# Patient Record
Sex: MALE | Race: WHITE | Employment: OTHER
[De-identification: names, ages, dates, MRNs, and addresses within clinical notes are randomized per-mention and may not be internally consistent; named-entity substitution may affect disease eponyms.]

---

## 2021-07-19 ENCOUNTER — TRANSCRIBE ORDERS (OUTPATIENT)
Dept: OTHER | Age: 41
End: 2021-07-19

## 2021-07-19 DIAGNOSIS — M79.2 NEUROPATHIC PAIN: ICD-10-CM

## 2021-07-19 DIAGNOSIS — J01.01 ACUTE RECURRENT MAXILLARY SINUSITIS: Primary | ICD-10-CM

## 2021-10-06 NOTE — TELEPHONE ENCOUNTER
FUTURE VISIT INFORMATION      FUTURE VISIT INFORMATION:    Date: 10.26.21    Time: 8 AM    Location:  Allergy  REFERRAL INFORMATION:    Referring provider:  Liliana Rogers    Referring providers clinic:  [facility]    Reason for visit/diagnosis  Acute recurrent maxillary sinusitis, immune deficiency disorder.     RECORDS REQUESTED FROM:       Clinic name Comments Records Status Imaging Status   [facility] 7.19.21, 7.1.21 Ken  8.24.21, 7.29.21 Harley  More in CE if needed CE

## 2021-10-25 NOTE — PROGRESS NOTES
[facility] Dermato-allergology Note  Office visit  Encounter Date: Oct 26, 2021  ____________________________________________    CC: No chief complaint on file.  CVID- referral by PCP at patient's request     HPI:  (10/25/21)  Mr. Vinay Washington is a(n) 41 year old male who presents today as a new patient for allergy consultation.    - History of CVID with recurrent sinusitis infections. Started 5 years ago. Would get 20-30 episodes in a year.   - Saw an outside allergist ~1 year ago where prick testing was negative however IG levels were checked and notable for low IgG and IgA. IgE slightly elevated at that time as well.   - Started on Immunoglobulin infusions with slow uptitration. Now doing subc 16g injections weekly. Last IgA was 36 on 2/15/2021 and IgG subclasses were wnl.   - Patient also started on prophylactic azithromycin three times per week.  - Additionally has followed with ENT and reports two prior nasal/septal procedures.   - Since starting IG and prophylactic abx notes reduction in frequency in sinus infectious to about 10x over the last year.   - Typical symptoms include sinus pressure, congestion, HA, forehead swelling as well as upset stomach.   - He has been following with infectious disease for treatment but he felt he would benefit from additional consultation.   - Denies any history of known seasonal, pet or drug allergies.   - Has a dog (lundberg retriever). Also horses and outdoor cats- all without issues.   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   - Up to date on all vaccines.     - otherwise feeling well in usual state of health    Physical exam:  General: In no acute distress, well-developed, well-nourished  Eyes: no conjunctivitis  ENT: no signs of rhinitis   Pulmonary: no wheezing or coughing  Skin: Focused examination of the skin on test sites was performed = see test results below  Focused examination of the arms and legs was  performed. No concerning rashes or lesions.     Past Medical History:   There is no problem list on file for this patient.    No past medical history on file.    Allergies:  Not on File    Medications:  No current outpatient medications on file.     No current facility-administered medications for this visit.       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 Patient has the following hobbies or non-occupational exposure - horses, dog, cats at home. No issues with these animals in past.     Family History:  No family history on file.    Previous Labs, Allergy Tests, Dermatopathology, Imaging:  IgE 8/6/2020- 25.7 (elevated)  IgA 2/15/2021- 36  IgG subclasses 2/11/2021- normal    Prick testing negative per patient report     Referred By: Liliana Rogers MD  [address] [place] [address]     Allergy Tests:    Past Allergy Test    Order for Future Allergy Testing:    [x] Outpatient  [] Inpatient: [facility]..../ Bed ....       Skin Atopy (atopic dermatitis) [] Yes   [x] No .........  Contact allergies:   [] Yes   [x] No ..........  Hand eczema:   [] Yes   [x] No           Leading hand:   [] R   [] L       [] Ambidextrous         Drug allergies:        [] Yes   [x] No  which?......    Urticaria/Angioedema  [] Yes   [x] No .........  Food Allergy:  [] Yes   [x] No  which?......  Pets :  [x] Yes   [] No  Which - horses, cats, dogs        [x]  Rhinitis   [x] Conjunctivitis   [x] Sinusitis   [] Polyposis   [] Otitis   [] Pharyngitis         []  none  Operations:   [] Tonsils   [] Septum   [x] Sinus   [] Polyposis        [] Asthma bronchiale   [] Coughing      []  none  Symptoms (mostly Rhinoconjunctivitis and Asthma) aggravated by:  Season   [] I   [] II   [] III   [] IV   []V   []VI   []VII   []VIII   []IX   []X   []XI   []XI     [x] perennial   Day time      [] morning   [] noon      [] evening        [] night    [x] whole day........  []   none  Location/changes    [x] inside        [x] outside   [] mountains    [] sea     [] others.............   []  none  Triggers, specific     [] animals     [] plants     [] dust              [] others ...........................    [x]  none  Triggers, others       [] work          [] psyche    [] sport            [] others .............................  [x]  none  Irritant                [] phys efforts [] smoke    [] heat/cold     [] odors  []others............... [x]  none      Order for SDS)       Order for PRICK TESTS    Reason for tests (suspected allergy): dogs, cats, horses  Known previous allergies: none    Standardized prick panels  [x] Atopic panel (20 tests)  [] Pediatric Panel (12 tests)  [] Milk, Meat, Eggs, Grains (20 tests)   [] Dust, Epithelia, Feathers (10 tests)  [] Fish, Seafood, Shellfish (17 tests)  [] Nuts, Beans (14 tests)  [] Spice, Vegetable, Fruit (17 tests)  [] Pollen Panel = Tree, Grass, Weed (24 tests)  [x] Others:  horse      [] Patient's own products: ...    DO NOT test if chemical or biological identity is unknown!     always ask from patient the product information and safety sheets (MSDS)     Standardized intradermal tests  [x] Penicillium notatum [x] Aspergillus fumigatus [x] House dust mites D.far & D. pteron  [] Cat    [x] dog  [] Others: ...  [] Bee venom   [] Wasp venom  !!Specific protocol with dilutions!!       Order for Drug allergy tests (prick & Intradermal)    [x] Penicillin G  [x] Ampicillin [] Cefazolin   [] Ceftriaxone   [] Ceftazidime  [] Bactrim    [x] Others: ... Doxycycline  Order for ... as test date    Atopy Screen (Placed 10/26/21)    No Substance Readings (15 min) Evaluation   POS Histamine 1mg/ml ++    NEG NaCl 0.9% -      No Substance Readings (15 min) Evaluation   1 Alternaria alternata (tenuis)  -    2 Cladosporium herbarum -    3 Aspergillus fumigatus -    4 Penicillium notatum -    5 Dermatophagoides pteronyssinus -    6 Dermatophagoides farinae -    7  Dog epithelium (canis spp) -    8 Cat hair (kelly catus) -    9 Cockroach   (Blatella americana & germanica) -    10 Grass mix midwest   (Evelyn, Orchard, Redtop, Vivek) -    11 Ken grass (sorghum halepense) -    12 Weed mix   (common Cocklebur, Lamb s quarters, rough redroot Pigweed, Dock/Sorrel) -    13 Mug wort (artemisia vulgare) -    14 Ragweed giant/short (ambrosia spp) -    15 White birch (Betula papyrifera) -    16 Tree mix 1 (Pecan, Maple BHR, Oak RVW, american [place], black [place]) -    17 Red cedar (juniperus virginia) -    18 Tree mix 2   (white Kee, river/red Birch, black [place], common Grimes, american Elm) -    19 Box elder/Maple mix (acer spp) -    20 [place]ee shagbark (carya ovata) -    21  Horse epithelium     Conclusion: skin prick test negative      Intradermal Testing (Placed 10/26/21)    No Substance Conc.  Reading (15min)  immediate Papule [mm] / Erythema [mm] Reading   (... days)  delayed Papule [mm] / Erythema [mm] Remarks   DF Standard Dust Mite - D. Farinae 1:10 -   -    DP Standard Dust Mite - D. Pteronyssinus 1:10 -   -    A Aspergillus fumigatus  1:10 -   -    P Penicillium notatum 1:10 -   -     Dog epithelium 1:10 +/++ 10/10  -      1:10 -   -    Conclusions: some immediate type reaction to dog epithelium, not the others. Patient will feed back if delayed reaction.    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  * stable chronic illness  In the context of CVID. Now on prophylactic abx three times per week and subc IG. May be a component of atopy as well. Prick testing negative today however intradermal testing with some immediate type reaction to dog epithelium, not the others. Patient will feed back if delayed reaction.  - Will plan to follow up after seeing hematology     # CVID  * stable chronic illness  - referral  to Hematology for further guidance in management  - Follow up with ENT (Dr Murcia from [facility])  - Continue subc IG injections q weekly as rx     These conclusions are made at the best of one's knowledge and belief based on the provided evidence such as patient's history and allergy test results and they can change over time or can be incomplete because of missing information's.    ==> Treatment Plan:  - Prick testing today negative  - Intradermal testing with some immediate type reaction to dog epithelium, not the others. Patient will feed back if delayed reaction.  - Referral placed to Hematology  - Follow up in Derm-Allergy clinic in 8 weeks       Procedures Performed: Allergy tests, including prick, intradermal tests.    Staff and Resident:  Provider    Follow-up in Derm-Allergy clinic 8 weeks    Henry Cooper MD   Internal Medicine PGY 2  Allergy Clinic     Staff Physician Comments:  I saw and evaluated the patient with the resident and I agree with the assessment and plan as documented in the resident's note.    Nilesh Louie MD  Professor  Head of Dermato-Allergy Division  Department of Dermatology  [facility]    I spent a total of 40 minutes with Vinay Washington. This time was spent counseling the patient and/or coordinating care, explaining the allergy tests, performing allergy tests and assessing the clinical relevance.

## 2021-10-26 ENCOUNTER — OFFICE VISIT (OUTPATIENT)
Dept: ALLERGY | Facility: CLINIC | Age: 41
End: 2021-10-26
Attending: FAMILY MEDICINE
Payer: COMMERCIAL

## 2021-10-26 ENCOUNTER — PRE VISIT (OUTPATIENT)
Dept: ALLERGY | Facility: CLINIC | Age: 41
End: 2021-10-26

## 2021-10-26 ENCOUNTER — PATIENT OUTREACH (OUTPATIENT)
Dept: ONCOLOGY | Facility: CLINIC | Age: 41
End: 2021-10-26

## 2021-10-26 DIAGNOSIS — J30.81 ALLERGY TO DOG DANDER: ICD-10-CM

## 2021-10-26 DIAGNOSIS — D83.9 COMMON VARIABLE IMMUNODEFICIENCY (H): Primary | ICD-10-CM

## 2021-10-26 DIAGNOSIS — J30.89 NON-SEASONAL ALLERGIC RHINITIS DUE TO OTHER ALLERGIC TRIGGER: ICD-10-CM

## 2021-10-26 PROCEDURE — 99203 OFFICE O/P NEW LOW 30 MIN: CPT | Mod: 25 | Performed by: DERMATOLOGY

## 2021-10-26 PROCEDURE — 95024 IQ TESTS W/ALLERGENIC XTRCS: CPT | Performed by: DERMATOLOGY

## 2021-10-26 PROCEDURE — 95004 PERQ TESTS W/ALRGNC XTRCS: CPT | Performed by: DERMATOLOGY

## 2021-10-26 RX ORDER — OXYCODONE HYDROCHLORIDE 5 MG/1
5 TABLET ORAL
COMMUNITY
Start: 2021-10-12 | End: 2022-10-12

## 2021-10-26 RX ORDER — ALBUTEROL SULFATE 90 UG/1
2 AEROSOL, METERED RESPIRATORY (INHALATION)
COMMUNITY
Start: 2021-02-05 | End: 2022-11-08

## 2021-10-26 RX ORDER — TRAZODONE HYDROCHLORIDE 150 MG/1
300 TABLET ORAL
COMMUNITY
Start: 2021-05-28

## 2021-10-26 RX ORDER — EPINEPHRINE 0.3 MG/.3ML
0.3 INJECTION SUBCUTANEOUS
COMMUNITY
Start: 2021-01-04 | End: 2022-01-04

## 2021-10-26 RX ORDER — LEVOFLOXACIN 500 MG/1
TABLET, FILM COATED ORAL
COMMUNITY
Start: 2021-10-12 | End: 2022-11-08

## 2021-10-26 RX ORDER — AZITHROMYCIN 250 MG/1
TABLET, FILM COATED ORAL
COMMUNITY
Start: 2021-01-13

## 2021-10-26 RX ORDER — CITALOPRAM HYDROBROMIDE 20 MG/1
20 TABLET ORAL
COMMUNITY

## 2021-10-26 RX ORDER — ALPRAZOLAM 0.25 MG
0.25 TABLET ORAL
COMMUNITY
Start: 2021-08-02 | End: 2022-01-29

## 2021-10-26 RX ORDER — PANTOPRAZOLE SODIUM 40 MG/1
40 TABLET, DELAYED RELEASE ORAL
COMMUNITY
Start: 2021-05-28 | End: 2022-05-28

## 2021-10-26 ASSESSMENT — PAIN SCALES - GENERAL: PAINLEVEL: NO PAIN (0)

## 2021-10-26 NOTE — PROGRESS NOTES
Writer received referral for CVID. Reviewed for urgency based on labs and symptomology. Appropriate scheduling instructions added and referral sent to New Patient Scheduling for completion.

## 2021-10-26 NOTE — NURSING NOTE
Chief Complaint   Patient presents with     Allergy Consult     Klaus is here today due to having recurrent infections and he has seen an allergist for this but things are going how he would hope.     Marcus Rogers, Paramedic

## 2021-11-23 NOTE — PROGRESS NOTES
RECORDS STATUS - ALL OTHER DIAGNOSIS      RECORDS RECEIVED FROM: [facility]/ [place]  In [place]/ [facility]    DATE RECEIVED: 11/24/2021   NOTES STATUS DETAILS   OFFICE NOTE from referring provider Complete Epic    per pt, Nilesh Louie MD   OFFICE NOTE from medical oncologist N/A CE- Consult 10/12/2020 - CVID    3/23/2020 Chronic Pansinusitis    DISCHARGE SUMMARY from hospital     DISCHARGE REPORT from the ER     OPERATIVE REPORT     MEDICATION LIST     CLINICAL TRIAL TREATMENTS TO DATE     LABS     PATHOLOGY REPORTS     ANYTHING RELATED TO DIAGNOSIS Complete Labs last updated on 5/24/2021   GENONOMIC TESTING     TYPE:     IMAGING (NEED IMAGES & REPORT)     CT SCANS Complete- [facility]     MRI     MAMMO     ULTRASOUND     PET       Action    Action Taken 11/23/2021 8:24AM KAIT     I called [facility] to have IMG pushed to PACS  Ph: [phone]     I called [facility] Ph: [phone] #3 - they will push the CT image over soon!    I called [facility] in [place]- Ph: [phone]    11/23/2021 11:12AM KAIT   I resolved imaging in PACS    I called pt Vinay - phone went to .

## 2021-11-24 ENCOUNTER — ONCOLOGY VISIT (OUTPATIENT)
Dept: ONCOLOGY | Facility: CLINIC | Age: 41
End: 2021-11-24
Attending: DERMATOLOGY
Payer: COMMERCIAL

## 2021-11-24 ENCOUNTER — PRE VISIT (OUTPATIENT)
Dept: ONCOLOGY | Facility: CLINIC | Age: 41
End: 2021-11-24

## 2021-11-24 VITALS
BODY MASS INDEX: 24.84 KG/M2 | TEMPERATURE: 98.6 F | HEART RATE: 71 BPM | WEIGHT: 204 LBS | RESPIRATION RATE: 18 BRPM | OXYGEN SATURATION: 100 % | HEIGHT: 76 IN | SYSTOLIC BLOOD PRESSURE: 131 MMHG | DIASTOLIC BLOOD PRESSURE: 88 MMHG

## 2021-11-24 DIAGNOSIS — Z53.9 ERRONEOUS ENCOUNTER--DISREGARD: Primary | ICD-10-CM

## 2021-11-24 DIAGNOSIS — D83.9 COMMON VARIABLE IMMUNODEFICIENCY (H): ICD-10-CM

## 2021-11-24 LAB
BASOPHILS # BLD AUTO: 0 10E3/UL (ref 0–0.2)
BASOPHILS NFR BLD AUTO: 1 %
CREAT UR-MCNC: 57 MG/DL
EOSINOPHIL # BLD AUTO: 0.1 10E3/UL (ref 0–0.7)
EOSINOPHIL NFR BLD AUTO: 2 %
ERYTHROCYTE [DISTWIDTH] IN BLOOD BY AUTOMATED COUNT: 13.3 % (ref 10–15)
HCT VFR BLD AUTO: 44.5 % (ref 40–53)
HGB BLD-MCNC: 15.2 G/DL (ref 13.3–17.7)
HOLD SPECIMEN: NORMAL
IMM GRANULOCYTES # BLD: 0 10E3/UL
IMM GRANULOCYTES NFR BLD: 0 %
LYMPHOCYTES # BLD AUTO: 2 10E3/UL (ref 0.8–5.3)
LYMPHOCYTES NFR BLD AUTO: 32 %
MCH RBC QN AUTO: 30 PG (ref 26.5–33)
MCHC RBC AUTO-ENTMCNC: 34.2 G/DL (ref 31.5–36.5)
MCV RBC AUTO: 88 FL (ref 78–100)
MONOCYTES # BLD AUTO: 0.4 10E3/UL (ref 0–1.3)
MONOCYTES NFR BLD AUTO: 7 %
NEUTROPHILS # BLD AUTO: 3.7 10E3/UL (ref 1.6–8.3)
NEUTROPHILS NFR BLD AUTO: 58 %
NRBC # BLD AUTO: 0 10E3/UL
NRBC BLD AUTO-RTO: 0 /100
PLATELET # BLD AUTO: 144 10E3/UL (ref 150–450)
PROT UR-MCNC: 0.05 G/L
PROT/CREAT 24H UR: 0.09 G/G CR (ref 0–0.2)
RBC # BLD AUTO: 5.07 10E6/UL (ref 4.4–5.9)
RETICS # AUTO: 0.05 10E6/UL (ref 0.03–0.1)
RETICS/RBC NFR AUTO: 1 % (ref 0.5–2)
TOTAL PROTEIN SERUM FOR ELP: 7.6 G/DL (ref 6.8–8.8)
TSH SERPL DL<=0.005 MIU/L-ACNC: 0.53 MU/L (ref 0.4–4)
WBC # BLD AUTO: 6.4 10E3/UL (ref 4–11)

## 2021-11-24 PROCEDURE — 85060 BLOOD SMEAR INTERPRETATION: CPT | Performed by: PATHOLOGY

## 2021-11-24 PROCEDURE — 82784 ASSAY IGA/IGD/IGG/IGM EACH: CPT | Performed by: NURSE PRACTITIONER

## 2021-11-24 PROCEDURE — 88189 FLOWCYTOMETRY/READ 16 & >: CPT | Performed by: PATHOLOGY

## 2021-11-24 PROCEDURE — 88184 FLOWCYTOMETRY/ TC 1 MARKER: CPT | Performed by: PATHOLOGY

## 2021-11-24 PROCEDURE — 84443 ASSAY THYROID STIM HORMONE: CPT | Performed by: NURSE PRACTITIONER

## 2021-11-24 PROCEDURE — 88185 FLOWCYTOMETRY/TC ADD-ON: CPT | Performed by: NURSE PRACTITIONER

## 2021-11-24 PROCEDURE — 84156 ASSAY OF PROTEIN URINE: CPT | Performed by: NURSE PRACTITIONER

## 2021-11-24 PROCEDURE — 84165 PROTEIN E-PHORESIS SERUM: CPT | Performed by: NURSE PRACTITIONER

## 2021-11-24 PROCEDURE — 85045 AUTOMATED RETICULOCYTE COUNT: CPT | Performed by: NURSE PRACTITIONER

## 2021-11-24 PROCEDURE — 84166 PROTEIN E-PHORESIS/URINE/CSF: CPT | Performed by: NURSE PRACTITIONER

## 2021-11-24 PROCEDURE — 93000 ELECTROCARDIOGRAM COMPLETE: CPT | Performed by: INTERNAL MEDICINE

## 2021-11-24 PROCEDURE — 36415 COLL VENOUS BLD VENIPUNCTURE: CPT | Performed by: NURSE PRACTITIONER

## 2021-11-24 PROCEDURE — 85025 COMPLETE CBC W/AUTO DIFF WBC: CPT | Performed by: NURSE PRACTITIONER

## 2021-11-24 PROCEDURE — 83883 ASSAY NEPHELOMETRY NOT SPEC: CPT | Performed by: NURSE PRACTITIONER

## 2021-11-24 PROCEDURE — 84155 ASSAY OF PROTEIN SERUM: CPT | Performed by: NURSE PRACTITIONER

## 2021-11-24 ASSESSMENT — MIFFLIN-ST. JEOR: SCORE: 1941.84

## 2021-11-24 ASSESSMENT — PAIN SCALES - GENERAL: PAINLEVEL: NO PAIN (0)

## 2021-11-24 NOTE — LETTER
December 22, 2021      Vinay Washington  [address] [id number]        Dear ,    We are writing to inform you of your test results.    {results letter list:380386}    No results found from the In Basket message.    If you have any questions or concerns, please call the clinic at the number listed above.       Sincerely,

## 2021-11-24 NOTE — LETTER
11/24/2021         RE: Vinay Washington  [address] [place] [id number]        Dear Colleague,    Thank you for referring your patient, Vinay Washington, to the [facility]. Please see a copy of my visit note below.    Heart burn w/ abdominal pain w/ body aches w/ headaches.    Using weekly IVIG shots.    Mother and sister has h pylori.  Brother w/ likely IBD.    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        This encounter was opened in error. Please disregard.      Again, thank you for allowing me to participate in the care of your patient.        Sincerely,        Benjy Ruffin MD

## 2021-11-24 NOTE — NURSING NOTE
"Oncology Rooming Note    November 24, 2021 2:41 PM   Vinay Washington is a 41 year old male who presents for:    Chief Complaint   Patient presents with     Oncology Clinic Visit     New Patient     Initial Vitals: /88 (BP Location: Left arm)   Pulse 71   Temp 98.6  F (37  C) (Oral)   Resp 18   Ht 1.93 m (6' 4\")   Wt 92.5 kg (204 lb)   SpO2 100%   BMI 24.83 kg/m   Estimated body mass index is 24.83 kg/m  as calculated from the following:    Height as of this encounter: 1.93 m (6' 4\").    Weight as of this encounter: 92.5 kg (204 lb). Body surface area is 2.23 meters squared.  No Pain (0) Comment: Data Unavailable   No LMP for male patient.  Allergies reviewed: Yes  Medications reviewed: Yes    Medications: Medication refills not needed today.  Pharmacy name entered into [employer]: [id number] - [place], [place] - [address] [facility]    Clinical concerns: New patient       Donna Rose LPN              "

## 2021-11-24 NOTE — PROGRESS NOTES
Heart burn w/ abdominal pain w/ body aches w/ headaches.    Using weekly IVIG shots.    Mother and sister has h pylori.  Brother w/ likely IBD.    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

## 2021-11-26 LAB
ALBUMIN SERPL ELPH-MCNC: 4.6 G/DL (ref 3.7–5.1)
ALPHA1 GLOB SERPL ELPH-MCNC: 0.2 G/DL (ref 0.2–0.4)
ALPHA2 GLOB SERPL ELPH-MCNC: 0.7 G/DL (ref 0.5–0.9)
B-GLOBULIN SERPL ELPH-MCNC: 0.7 G/DL (ref 0.6–1)
GAMMA GLOB SERPL ELPH-MCNC: 1.3 G/DL (ref 0.7–1.6)
IGA SERPL-MCNC: 31 MG/DL (ref 84–499)
IGG SERPL-MCNC: 1406 MG/DL (ref 610–1616)
IGM SERPL-MCNC: 42 MG/DL (ref 35–242)
KAPPA LC FREE SER-MCNC: 1.14 MG/DL (ref 0.33–1.94)
KAPPA LC FREE/LAMBDA FREE SER NEPH: 1.05 {RATIO} (ref 0.26–1.65)
LAMBDA LC FREE SERPL-MCNC: 1.09 MG/DL (ref 0.57–2.63)
M PROTEIN MFR UR ELPH: 0 %
M PROTEIN SERPL ELPH-MCNC: 0 G/DL
PATH REPORT.COMMENTS IMP SPEC: NORMAL
PATH REPORT.FINAL DX SPEC: NORMAL
PATH REPORT.FINAL DX SPEC: NORMAL
PATH REPORT.MICROSCOPIC SPEC OTHER STN: NORMAL
PATH REPORT.RELEVANT HX SPEC: NORMAL
PATH REPORT.RELEVANT HX SPEC: NORMAL
PROT PATTERN SERPL ELPH-IMP: NORMAL
PROT PATTERN UR ELPH-IMP: NORMAL

## 2021-12-17 ENCOUNTER — PATIENT OUTREACH (OUTPATIENT)
Dept: ONCOLOGY | Facility: CLINIC | Age: 41
End: 2021-12-17
Payer: COMMERCIAL

## 2021-12-17 NOTE — PROGRESS NOTES
Received TC from patient requesting lab results. Pt verbalizes he is very disturbed that he has not heard from anyone with results from 11/24. Writer offered apology on behalf of [facility].  Offered video visit with MD to further discuss. Pt verbalizes he does not have capability of video visit, nor does he know how to use it. Discussed telephone call with Dr Ruffin. Pt agrees with plan to discuss on phone on Monday at 12:30pm. Visit scheduled.  Rebeca JUNIOR

## 2021-12-20 ENCOUNTER — VIRTUAL VISIT (OUTPATIENT)
Dept: ALLERGY | Facility: CLINIC | Age: 41
End: 2021-12-20
Payer: COMMERCIAL

## 2021-12-20 ENCOUNTER — VIRTUAL VISIT (OUTPATIENT)
Dept: ONCOLOGY | Facility: CLINIC | Age: 41
End: 2021-12-20
Payer: COMMERCIAL

## 2021-12-20 DIAGNOSIS — J30.89 NON-SEASONAL ALLERGIC RHINITIS DUE TO OTHER ALLERGIC TRIGGER: ICD-10-CM

## 2021-12-20 DIAGNOSIS — R53.82 CHRONIC FATIGUE: ICD-10-CM

## 2021-12-20 DIAGNOSIS — D83.9 COMMON VARIABLE IMMUNODEFICIENCY (H): Primary | ICD-10-CM

## 2021-12-20 DIAGNOSIS — J32.8 OTHER CHRONIC SINUSITIS: ICD-10-CM

## 2021-12-20 DIAGNOSIS — J30.81 ALLERGY TO DOG DANDER: ICD-10-CM

## 2021-12-20 PROCEDURE — 99214 OFFICE O/P EST MOD 30 MIN: CPT | Mod: 95 | Performed by: DERMATOLOGY

## 2021-12-20 PROCEDURE — 99212 OFFICE O/P EST SF 10 MIN: CPT | Mod: 95 | Performed by: INTERNAL MEDICINE

## 2021-12-20 NOTE — LETTER
2021         RE: Vinay Washington  [address] [place] [id number]        Dear Colleague,    Thank you for referring your patient, Vinay Washington, to the [facility]. Please see a copy of my visit note below.    Vinay is a 41 year old who is being evaluated via a billable telephone visit.  Currently in State of [place].    What phone number would you like to be contacted at? [phone]  How would you like to obtain your AVS? 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    Vinay and I reviewed that is likely in a difficult spot.  Minus some mild thrombocytopenia, his blood counts are reassuring.  Thyroid function is normal.  Peripheral blood smear is reassuring.  EKG has been reassuring.  Flow cytometry of the blood has been reassuring.  His exam shows no signs of lymphoproliferative disorders.  Past imaging has been in agreement.  I do suspect that his fatigue is likely secondary to his combined variable immunodeficiency.  He is a very active gentleman with a physically demanding/stressful job and I think he is likely more susceptible to noticing fatigue and others.  He is sleeping well.  He is eating well.  He is taking good care of himself physically.  For now we will continue to monitor and watch for symptoms.  We are also taking over control of his gammaglobulin injections.    Benjy Ruffin MD.  Total time 14 mins reviewing labs.    --------------------    Interval History:  Vinay returns for follow up of combined variable immunodeficiency.  No major changes since her last health visit.  He continues to sleep well.  He does feel well rested but runs out of energy during the workday.   Eating right.  Exercising.  No major stresses.  Majority of time spent reviewing labs..    --------------------    Physical Exam:  Phone visit.    Labs / Imaging / Path:  We reviewed labs, personally reviewed imaging and reviewed pathology reports      Again, thank you for allowing me to participate in the care of your patient.        Sincerely,        Benjy Ruffin MD

## 2021-12-20 NOTE — PROGRESS NOTES
[facility] Dermato-allergology Note  Virtual visit: store and forward Doximity call, start time: 1:18pm, end time: 1:50pm, date of images: none  Encounter Date: Dec 20, 2021  ____________________________________________    CC: No chief complaint on file.      HPI:  (12/19/21)  Mr. Vinay Washington is a(n) 41 year old male who presents today as a return patient for allergy consultation  - Follow-up in Derm-Allergy clinic 8 weeks  - otherwise feeling well in usual state of health    Physical exam:  General: In no acute distress, well-developed, well-nourished  Eyes: no conjunctivitis  ENT: no signs of rhinitis   Pulmonary: no wheezing or coughing  Skin: according to patient no active skin lesions    Earlier History and Allergy exams:  HPI:  (10/25/21)  - History of CVID with recurrent sinusitis infections. Started 5 years ago. Would get 20-30 episodes in a year.   - Saw an outside allergist ~1 year ago where prick testing was negative however IG levels were checked and notable for low IgG and IgA. IgE slightly elevated at that time as well.   - Started on Immunoglobulin infusions with slow uptitration. Now doing subc 16g injections weekly. Last IgA was 36 on 2/15/2021 and IgG subclasses were wnl.   - Patient also started on prophylactic azithromycin three times per week.  - Additionally has followed with ENT and reports two prior nasal/septal procedures.   - Since starting IG and prophylactic abx notes reduction in frequency in sinus infectious to about 10x over the last year.   - Typical symptoms include sinus pressure, congestion, HA, forehead swelling as well as upset stomach.   - He has been following with infectious disease for treatment but he felt he would benefit from additional consultation.   - Denies any history of known seasonal, pet or drug allergies.   - Has a dog (lundberg retriever). Also horses and outdoor cats- all without issues.   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   - Up to date on all vaccines.  - IVIG injections now for about 1.5 years     Past Medical History:   There is no problem list on file for this patient.    No past medical history on file.    Allergies:  Allergies   Allergen Reactions     Penicillins      Doxycycline Other (See Comments)     Funny feeling in head when on medication     Morphine Itching       Medications:  Current Outpatient Medications   Medication     albuterol (PROAIR HFA/PROVENTIL HFA/VENTOLIN HFA) 108 (90 Base) MCG/ACT inhaler     ALPRAZolam (XANAX) 0.25 MG tablet     azithromycin (ZITHROMAX) 250 MG tablet     citalopram (CELEXA) 20 MG tablet     diclofenac (VOLTAREN) 50 MG EC tablet     EPINEPHrine (ANY BX GENERIC EQUIV) 0.3 MG/0.3ML injection 2-pack     levofloxacin (LEVAQUIN) 500 MG tablet     oxyCODONE (ROXICODONE) 5 MG tablet     pantoprazole (PROTONIX) 40 MG EC tablet     tiZANidine (ZANAFLEX) 4 MG tablet     traZODone (DESYREL) 150 MG tablet     No current facility-administered medications for this visit.       Social History:  The patient works in [employer] and denies any allergic symptoms at work. Patient has the following hobbies or non-occupational exposure - horses, dog, cats at home. No issues with these animals in past.     Family History:  No family history on file.    Previous Labs, Allergy Tests, Dermatopathology, Imaging:  IgE 8/6/2020- 25.7 (elevated)  IgA 2/15/2021- 36  IgG subclasses 2/11/2021- normal    Prick testing negative per patient report     Referred By: Liliana Rogers MD  [facility] [address] [place] [address]     Allergy Tests:    Past Allergy Test    Order for Future Allergy Testing:    [x] Outpatient  [] Inpatient: [facility]..../ Bed ....       Skin Atopy (atopic dermatitis) [] Yes   [x] No .........  Contact allergies:   [] Yes   [x] No ..........  Hand eczema:   [] Yes   [x] No           Leading hand:   [] R   [] L       [] Ambidextrous         Drug allergies:         [] Yes   [x] No  which?......    Urticaria/Angioedema  [] Yes   [x] No .........  Food Allergy:  [] Yes   [x] No  which?......  Pets :  [x] Yes   [] No  Which - horses, cats, dogs        [x]  Rhinitis   [x] Conjunctivitis   [x] Sinusitis   [] Polyposis   [] Otitis   [] Pharyngitis         []  none  Operations:   [] Tonsils   [] Septum   [x] Sinus   [] Polyposis        [] Asthma bronchiale   [] Coughing      []  none  Symptoms (mostly Rhinoconjunctivitis and Asthma) aggravated by:  Season   [] I   [] II   [] III   [] IV   []V   []VI   []VII   []VIII   []IX   []X   []XI   []XI     [x] perennial   Day time      [] morning   [] noon      [] evening        [] night    [x] whole day........  []  none  Location/changes    [x] inside        [x] outside   [] mountains    [] sea     [] others.............   []  none  Triggers, specific     [] animals     [] plants     [] dust              [] others ...........................    [x]  none  Triggers, others       [] work          [] psyche    [] sport            [] others .............................  [x]  none  Irritant                [] phys efforts [] smoke    [] heat/cold     [] odors  []others............... [x]  none      Order for SDS)       Order for PRICK TESTS    Reason for tests (suspected allergy): dogs, cats, horses  Known previous allergies: none    Standardized prick panels  [x] Atopic panel (20 tests)  [] Pediatric Panel (12 tests)  [] Milk, Meat, Eggs, Grains (20 tests)   [] Dust, Epithelia, Feathers (10 tests)  [] Fish, Seafood, Shellfish (17 tests)  [] Nuts, Beans (14 tests)  [] Spice, Vegetable, Fruit (17 tests)  [] Pollen Panel = Tree, Grass, Weed (24 tests)  [x] Others:  horse      [] Patient's own products: ...    DO NOT test if chemical or biological identity is unknown!     always ask from patient the product information and safety sheets (MSDS)     Standardized intradermal tests  [x] Penicillium notatum [x] Aspergillus fumigatus [x] House dust mites  D.far & D. pteron  [] Cat    [x] dog  [] Others: ...  [] Bee venom   [] Wasp venom  !!Specific protocol with dilutions!!       Order for Drug allergy tests (prick & Intradermal)    [x] Penicillin G  [x] Ampicillin [] Cefazolin   [] Ceftriaxone   [] Ceftazidime  [] Bactrim    [x] Others: ... Doxycycline  Order for ... as test date    Atopy Screen (Placed 10/26/21)    No Substance Readings (15 min) Evaluation   POS Histamine 1mg/ml ++    NEG NaCl 0.9% -      No Substance Readings (15 min) Evaluation   1 Alternaria alternata (tenuis)  -    2 Cladosporium herbarum -    3 Aspergillus fumigatus -    4 Penicillium notatum -    5 Dermatophagoides pteronyssinus -    6 Dermatophagoides farinae -    7 Dog epithelium (canis spp) -    8 Cat hair (kelly catus) -    9 Cockroach   (Blatella americana & germanica) -    10 Grass mix midwest   (Evelyn, Orchard, Redtop, Vivek) -    11 Ken grass (sorghum halepense) -    12 Weed mix   (common Cocklebur, Lamb s quarters, rough redroot Pigweed, Dock/Sorrel) -    13 Mug wort (artemisia vulgare) -    14 Ragweed giant/short (ambrosia spp) -    15 White birch (Betula papyrifera) -    16 Tree mix 1 (Pecan, Maple BHR, Oak RVW, american [place], black [place]) -    17 Red cedar (juniperus virginia) -    18 Tree mix 2   (white Kee, river/red Birch, black [place], common [place], american Elm) -    19 Box elder/Maple mix (acer spp) -    20 [place] shagbark (carya ovata) -    21  Horse epithelium     Conclusion: skin prick test negative      Intradermal Testing (Placed 10/26/21)    No Substance Conc.  Reading (15min)  immediate Papule [mm] / Erythema [mm] Reading   (... days)  delayed Papule [mm] / Erythema [mm] Remarks   DF Standard Dust Mite - D. Farinae 1:10 -   -    DP Standard Dust Mite - D. Pteronyssinus 1:10 -   -    A Aspergillus fumigatus  1:10 -   -    P Penicillium notatum 1:10 -   -     Dog epithelium 1:10 +/++ 10/10  -      1:10 -   -    Conclusions: some immediate type reaction  to dog epithelium, not the others. No delayed reaction.    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 Now on prophylactic abx three times per week and subc IG. May be a component of atopy as well. Prick testing negative today however intradermal testing with some immediate type reaction to dog epithelium, not the others. Patient will feed back if delayed reaction.  - Will plan to follow up after seeing hematology     # CVID? with recurrent rhinosinusitis (low IgA) and URI  * stable chronic illness  - referral to Hematology for further guidance in management = no major hematologic problem   - Follow up with ENT (Dr Murcia from [facility]) = not really allergic problem and mostly recurrent infections (low IgA?)  - Continue subc IG injections q weekly as rx     These conclusions are made at the best of one's knowledge and belief based on the provided evidence such as patient's history and allergy test results and they can change over time or can be incomplete because of missing information's.    ==> Treatment Plan:  - continue treatment with family physician and ENT/Respiratory phyisians  - as long as patient has recurrent infections, then IVIG are justified.   - Maybe could try in summer one time to stop the IVIG and see how patient does without and then evaluate again IgA, IgG and IgM.  - PCP should take over the management of the patient      Staff: : provider    Follow-up in Derm-Allergy clinic if necessary  ___________________________    I spent a total of 32 minutes with Vinay Washington during today s  visit. This time was spent discussing all the individual test results, correlating them to the clinical relevance, counseling the patient and/or coordinating care and performing allergy tests  or procedures.

## 2021-12-20 NOTE — PATIENT INSTRUCTIONS
Assessment & Plan:    ==> Final Diagnosis:     # Recurrent rhinosinusitis, perennial with immediate sensitization to dog epithelium    No delayed sensitization to dust mites and molds  * stable chronic illness  In the context of CVID. Now on prophylactic abx three times per week and subc IG. May be a component of atopy as well. Prick testing negative today however intradermal testing with some immediate type reaction to dog epithelium, not the others. Patient will feed back if delayed reaction.  - Will plan to follow up after seeing hematology     # CVID? with recurrent rhinosinusitis (low IgA) and URI  * stable chronic illness  - referral to Hematology for further guidance in management = no major hematologic problem   - Follow up with ENT (Dr Murcia from [facility]) = not really allergic problem and mostly recurrent infections (low IgA?)  - Continue subc IG injections q weekly as rx     These conclusions are made at the best of one's knowledge and belief based on the provided evidence such as patient's history and allergy test results and they can change over time or can be incomplete because of missing information's.    ==> Treatment Plan:  - continue treatment with family physician and ENT/Respiratory phyisians  - as long as patient has recurrent infections, then IVIG are justified.   - Maybe could try in summer one time to stop the IVIG and see how patient does without and then evaluate again IgA, IgG and IgM.  - - PCP should take over the management of the patient

## 2021-12-20 NOTE — PROGRESS NOTES
Vinay is a 41 year old who is being evaluated via a billable telephone visit.  Currently in State of [place].    What phone number would you like to be contacted at? [phone]  How would you like to obtain your AVS? Mail a copy  Viji Abreu

## 2021-12-20 NOTE — NURSING NOTE
Chief Complaint   Patient presents with     Allergy Consult     Klaus was here about 8 weeks ago and has no changes and said things are still not going great.     Marcus Rogers, Paramedic

## 2021-12-20 NOTE — LETTER
12/20/2021         RE: Vinay Washington  [address] [place] [id number]        Dear Colleague,    Thank you for referring your patient, Vinay Washington, to the [facility] [place]. Please see a copy of my visit note below.    [facility] Dermato-allergology Note  Virtual visit: store and forward Doximity call, start time: 1:18pm, end time: 1:50pm, date of images: none  Encounter Date: Dec 20, 2021  ____________________________________________    CC: No chief complaint on file.      HPI:  (12/19/21)  Mr. Vinay Washington is a(n) 41 year old male who presents today as a return patient for allergy consultation  - Follow-up in Derm-Allergy clinic 8 weeks  - otherwise feeling well in usual state of health    Physical exam:  General: In no acute distress, well-developed, well-nourished  Eyes: no conjunctivitis  ENT: no signs of rhinitis   Pulmonary: no wheezing or coughing  Skin: according to patient no active skin lesions    Earlier History and Allergy exams:  HPI:  (10/25/21)  - History of CVID with recurrent sinusitis infections. Started 5 years ago. Would get 20-30 episodes in a year.   - Saw an outside allergist ~1 year ago where prick testing was negative however IG levels were checked and notable for low IgG and IgA. IgE slightly elevated at that time as well.   - Started on Immunoglobulin infusions with slow uptitration. Now doing subc 16g injections weekly. Last IgA was 36 on 2/15/2021 and IgG subclasses were wnl.   - Patient also started on prophylactic azithromycin three times per week.  - Additionally has followed with ENT and reports two prior nasal/septal procedures.   - Since starting IG and prophylactic abx notes reduction in frequency in sinus infectious to about 10x over the last year.   - Typical symptoms include sinus pressure, congestion, HA, forehead swelling as well as upset stomach.   - He has been following with infectious  disease for treatment but he felt he would benefit from additional consultation.   - Denies any history of known seasonal, pet or drug allergies.   - Has a dog (lundberg retriever). Also horses and outdoor cats- all without issues.   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   - Up to date on all vaccines.  - IVIG injections now for about 1.5 years     Past Medical History:   There is no problem list on file for this patient.    No past medical history on file.    Allergies:  Allergies   Allergen Reactions     Penicillins      Doxycycline Other (See Comments)     Funny feeling in head when on medication     Morphine Itching       Medications:  Current Outpatient Medications   Medication     albuterol (PROAIR HFA/PROVENTIL HFA/VENTOLIN HFA) 108 (90 Base) MCG/ACT inhaler     ALPRAZolam (XANAX) 0.25 MG tablet     azithromycin (ZITHROMAX) 250 MG tablet     citalopram (CELEXA) 20 MG tablet     diclofenac (VOLTAREN) 50 MG EC tablet     EPINEPHrine (ANY BX GENERIC EQUIV) 0.3 MG/0.3ML injection 2-pack     levofloxacin (LEVAQUIN) 500 MG tablet     oxyCODONE (ROXICODONE) 5 MG tablet     pantoprazole (PROTONIX) 40 MG EC tablet     tiZANidine (ZANAFLEX) 4 MG tablet     traZODone (DESYREL) 150 MG tablet     No current facility-administered medications for this visit.       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 Patient has the following hobbies or non-occupational exposure - horses, dog, cats at home. No issues with these animals in past.     Family History:  No family history on file.    Previous Labs, Allergy Tests, Dermatopathology, Imaging:  IgE 8/6/2020- 25.7 (elevated)  IgA 2/15/2021- 36  IgG subclasses 2/11/2021- normal    Prick testing negative per patient report     Referred By: Liliana Rogers MD  [address] [place] [address]     Allergy Tests:    Past Allergy Test    Order for Future Allergy Testing:    [x]  Outpatient  [] Inpatient: [facility]..../ Bed ....       Skin Atopy (atopic dermatitis) [] Yes   [x] No .........  Contact allergies:   [] Yes   [x] No ..........  Hand eczema:   [] Yes   [x] No           Leading hand:   [] R   [] L       [] Ambidextrous         Drug allergies:        [] Yes   [x] No  which?......    Urticaria/Angioedema  [] Yes   [x] No .........  Food Allergy:  [] Yes   [x] No  which?......  Pets :  [x] Yes   [] No  Which - horses, cats, dogs        [x]  Rhinitis   [x] Conjunctivitis   [x] Sinusitis   [] Polyposis   [] Otitis   [] Pharyngitis         []  none  Operations:   [] Tonsils   [] Septum   [x] Sinus   [] Polyposis        [] Asthma bronchiale   [] Coughing      []  none  Symptoms (mostly Rhinoconjunctivitis and Asthma) aggravated by:  Season   [] I   [] II   [] III   [] IV   []V   []VI   []VII   []VIII   []IX   []X   []XI   []XI     [x] perennial   Day time      [] morning   [] noon      [] evening        [] night    [x] whole day........  []  none  Location/changes    [x] inside        [x] outside   [] mountains    [] sea     [] others.............   []  none  Triggers, specific     [] animals     [] plants     [] dust              [] others ...........................    [x]  none  Triggers, others       [] work          [] psyche    [] sport            [] others .............................  [x]  none  Irritant                [] phys efforts [] smoke    [] heat/cold     [] odors  []others............... [x]  none      Order for SDS)       Order for PRICK TESTS    Reason for tests (suspected allergy): dogs, cats, horses  Known previous allergies: none    Standardized prick panels  [x] Atopic panel (20 tests)  [] Pediatric Panel (12 tests)  [] Milk, Meat, Eggs, Grains (20 tests)   [] Dust, Epithelia, Feathers (10 tests)  [] Fish, Seafood, Shellfish (17 tests)  [] Nuts, Beans (14 tests)  [] Spice, Vegetable, Fruit (17 tests)  [] Pollen Panel = Tree, Grass, Weed (24 tests)  [x] Others:   horse      [] Patient's own products: ...    DO NOT test if chemical or biological identity is unknown!     always ask from patient the product information and safety sheets (MSDS)     Standardized intradermal tests  [x] Penicillium notatum [x] Aspergillus fumigatus [x] House dust mites D.far & D. pteron  [] Cat    [x] dog  [] Others: ...  [] Bee venom   [] Wasp venom  !!Specific protocol with dilutions!!       Order for Drug allergy tests (prick & Intradermal)    [x] Penicillin G  [x] Ampicillin [] Cefazolin   [] Ceftriaxone   [] Ceftazidime  [] Bactrim    [x] Others: ... Doxycycline  Order for ... as test date    Atopy Screen (Placed 10/26/21)    No Substance Readings (15 min) Evaluation   POS Histamine 1mg/ml ++    NEG NaCl 0.9% -      No Substance Readings (15 min) Evaluation   1 Alternaria alternata (tenuis)  -    2 Cladosporium herbarum -    3 Aspergillus fumigatus -    4 Penicillium notatum -    5 Dermatophagoides pteronyssinus -    6 Dermatophagoides farinae -    7 Dog epithelium (canis spp) -    8 Cat hair (kelly catus) -    9 Cockroach   (Blatella americana & germanica) -    10 Grass mix midwest   (Evelyn, Orchard, Redtop, Vivek) -    11 Ken grass (sorghum halepense) -    12 Weed mix   (common Cocklebur, Lamb s quarters, rough redroot Pigweed, Dock/Sorrel) -    13 Mug wort (artemisia vulgare) -    14 Ragweed giant/short (ambrosia spp) -    15 White birch (Betula papyrifera) -    16 Tree mix 1 (Pecan, Maple BHR, Oak RVW, american [place], black [place]) -    17 Red cedar (juniperus virginia) -    18 Tree mix 2   (white Kee, river/red Birch, black [place], common [place], american Elm) -    19 Box elder/Maple mix (acer spp) -    20 [place] shagbark (carya ovata) -    21  Horse epithelium     Conclusion: skin prick test negative      Intradermal Testing (Placed 10/26/21)    No Substance Conc.  Reading (15min)  immediate Papule [mm] / Erythema [mm] Reading   (... days)  delayed Papule [mm] / Erythema  [mm] Remarks   DF Standard Dust Mite - D. Farinae 1:10 -   -    DP Standard Dust Mite - D. Pteronyssinus 1:10 -   -    A Aspergillus fumigatus  1:10 -   -    P Penicillium notatum 1:10 -   -     Dog epithelium 1:10 +/++ 10/10  -      1:10 -   -    Conclusions: some immediate type reaction to dog epithelium, not the others. No delayed reaction.    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 Now on prophylactic abx three times per week and subc IG. May be a component of atopy as well. Prick testing negative today however intradermal testing with some immediate type reaction to dog epithelium, not the others. Patient will feed back if delayed reaction.  - Will plan to follow up after seeing hematology     # CVID? with recurrent rhinosinusitis (low IgA) and URI  * stable chronic illness  - referral to Hematology for further guidance in management = no major hematologic problem   - Follow up with [facility] ([facility]cia [facility]) = not really allergic problem and mostly recurrent infections (low IgA?)  - Continue subc IG injections q weekly as rx     These conclusions are made at the best of one's knowledge and belief based on the provided evidence such as patient's history and allergy test results and they can change over time or can be incomplete because of missing information's.    ==> Treatment Plan:  - continue treatment with family physician and ENT/Respiratory phyisians  - as long as patient has recurrent infections, then IVIG are justified.   - Maybe could try in summer one time to stop the IVIG and see how patient does without and then evaluate again IgA, IgG and IgM.  - PCP should take over the management of the patient      Staff: : provider    Follow-up in Derm-Allergy clinic if  necessary  ___________________________    I spent a total of 32 minutes with Vinay Washington during today s  visit. This time was spent discussing all the individual test results, correlating them to the clinical relevance, counseling the patient and/or coordinating care and performing allergy tests or procedures.           Again, thank you for allowing me to participate in the care of your patient.        Sincerely,        Nilesh Louie MD

## 2021-12-22 ENCOUNTER — PATIENT OUTREACH (OUTPATIENT)
Dept: CARE COORDINATION | Facility: CLINIC | Age: 41
End: 2021-12-22

## 2021-12-22 ENCOUNTER — VIRTUAL VISIT (OUTPATIENT)
Dept: ONCOLOGY | Facility: CLINIC | Age: 41
End: 2021-12-22
Payer: COMMERCIAL

## 2021-12-22 DIAGNOSIS — D83.9 CVID (COMMON VARIABLE IMMUNODEFICIENCY) (H): Primary | ICD-10-CM

## 2021-12-22 PROCEDURE — 99207 PR NO CHARGE LOS: CPT | Performed by: INTERNAL MEDICINE

## 2021-12-22 NOTE — PROGRESS NOTES
Oncology Distress Screening Follow-up  Clinical Social Work  [facility]    Identified Concern and Score From Distress Screenin. How concerned are you about your ability to eat?  3         2. How concerned are you about unintended weight loss or your current weight?  0         3. How concerned are you about feeling depressed or very sad?  0         4. How concerned are you about feeling anxious or very scared?  3         5. Do you struggle with the loss of meaning and jefe in your life?  Not at all         6. How concerned are you about work and home life issues that may be affected by your cancer?  6 Abnormal          7. How concerned are you about knowing what resources are available to help you?  8 Abnormal          8. Do you currently have what you would describe as Evangelical or spiritual struggles?             Not at all                   You can also ask to be contacted by one of our Oncology Supportive Care professionals.      9. If you want to be contacted by one of our professionals, I can send a message to them right now.  Oncology Dietitian                 Date of Distress Screenin21      Data: Vinay was seen virtually for common variable immunodeficiency.       Intervention/Education Provided:: SYDNEE called and spoke to Vinay this afternoon, introducing self and reason for call. Vniay was open to discussion. He states he is doing ok overall, glad to learn he doesn't have cancer. Vinay's main concern is related to stomach issues and not knowing what feeds are making him feel unwell. Doctor suggested a trial of a gluten free diet, he is not excited about this, but open to trying it. Vinay would like to speak to a dietitian. SYDNEE offered to send them a message and request they be in touch.       Follow-up Required: This patient is not an oncology patient, and will therefore not be followed in ongoing way by oncology social work. Vinay knows to reach out to  providers as needed in future if he is in need of additional psychosocial services/support.            NICHOLAS Mauricio, [facility]  Clinical , Adult Oncology  Phone: [phone]

## 2021-12-22 NOTE — PROGRESS NOTES
Vinay is a 41 year old who is being evaluated via a billable telephone visit. Currently in State of [place].     What phone number would you like to be contacted at? [phone]  How would you like to obtain your AVS? Mail a copy    Viji Abreu

## 2021-12-22 NOTE — LETTER
12/22/2021         RE: Vinay Washington  [address] [place] [address]        Dear Colleague,    Thank you for referring your patient, Vinay Washington, to the [facility]. Please see a copy of my visit note below.    Vinay is a 41 year old who is being evaluated via a billable telephone visit. [place] in [place]     What phone number would you like to be contacted at? [phone]  How would you like to obtain your AVS? Mail a copy    Viji Abreu      Phone visit to review results; <5 mins.    No charge.    Benjy Ruffin MD.      Again, thank you for allowing me to participate in the care of your patient.        Sincerely,        Benjy Ruffin MD

## 2021-12-27 DIAGNOSIS — D83.9 CVID (COMMON VARIABLE IMMUNODEFICIENCY) (H): Primary | ICD-10-CM

## 2021-12-28 ENCOUNTER — TELEPHONE (OUTPATIENT)
Dept: ONCOLOGY | Facility: CLINIC | Age: 41
End: 2021-12-28
Payer: COMMERCIAL

## 2021-12-28 NOTE — TELEPHONE ENCOUNTER
LVM- See Viji's note below:    Are you able to call this patient to see what time works for a video or phone visit in the next 1-2 weeks?     Thank you!     Viji

## 2022-01-04 ENCOUNTER — TELEPHONE (OUTPATIENT)
Dept: ONCOLOGY | Facility: CLINIC | Age: 42
End: 2022-01-04

## 2022-01-04 NOTE — TELEPHONE ENCOUNTER
CLINICAL NUTRITION SERVICES     Reason for Contact: Patient was contacted by phone due MNT phone appointment referred by Dr. Hu.    Action: RD called patient indicating reason for phone call. Left a VM with a return call back number.     Follow up: Wait for a return phone call.    Viji Lozano RDN, LDN  [facility]  [phone]

## 2022-01-18 NOTE — PROGRESS NOTES
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    Vinay and I reviewed that is likely in a difficult spot.  Minus some mild thrombocytopenia, his blood counts are reassuring.  Thyroid function is normal.  Peripheral blood smear is reassuring.  EKG has been reassuring.  Flow cytometry of the blood has been reassuring.  His exam shows no signs of lymphoproliferative disorders.  Past imaging has been in agreement.  I do suspect that his fatigue is likely secondary to his combined variable immunodeficiency.  He is a very active gentleman with a physically demanding/stressful job and I think he is likely more susceptible to noticing fatigue and others.  He is sleeping well.  He is eating well.  He is taking good care of himself physically.  For now we will continue to monitor and watch for symptoms.  We are also taking over control of his gammaglobulin injections.    Benjy Ruffin MD.  Total time 14 mins reviewing labs.    --------------------    Interval History:  Vinay returns for follow up of combined variable immunodeficiency.  No major changes since her last health visit.  He continues to sleep well.  He does feel well rested but runs out of energy during the workday.  Eating right.  Exercising.  No major stresses.  Majority of time spent reviewing labs..    --------------------    Physical Exam:  Phone visit.    Labs / Imaging / Path:  We reviewed labs, personally reviewed imaging and reviewed pathology reports

## 2022-02-18 DIAGNOSIS — D83.9 CVID (COMMON VARIABLE IMMUNODEFICIENCY) (H): ICD-10-CM

## 2022-02-18 NOTE — TELEPHONE ENCOUNTER
Patient called reporting that when Dr. Ruffin refilled his Immune Globulin, it was sent to the incorrect pharmacy. He usually gets the medication mailed to him through [facility]. He is requesting that the prescription be sent to [facility] in [place], [place].     Prescription pended to [facility] and will route to Dr. Ruffin.     Fern Sanchez, VERNON  Triage Nurse Advisor  [facility]

## 2022-02-22 NOTE — TELEPHONE ENCOUNTER
Patient is calling to check on the status of his request. He will be out of medication soon.     Writer will follow up with Dr. Ruffin.    Ally Mauricio, RN, BSN, PHN  [facility]

## 2022-11-08 ENCOUNTER — ONCOLOGY VISIT (OUTPATIENT)
Dept: ONCOLOGY | Facility: CLINIC | Age: 42
End: 2022-11-08
Payer: COMMERCIAL

## 2022-11-08 VITALS
HEART RATE: 71 BPM | SYSTOLIC BLOOD PRESSURE: 165 MMHG | WEIGHT: 210.7 LBS | BODY MASS INDEX: 25.65 KG/M2 | DIASTOLIC BLOOD PRESSURE: 89 MMHG | OXYGEN SATURATION: 98 %

## 2022-11-08 DIAGNOSIS — M79.604 PAIN IN BOTH LOWER EXTREMITIES: ICD-10-CM

## 2022-11-08 DIAGNOSIS — G43.009 MIGRAINE WITHOUT AURA AND WITHOUT STATUS MIGRAINOSUS, NOT INTRACTABLE: ICD-10-CM

## 2022-11-08 DIAGNOSIS — M79.605 PAIN IN BOTH LOWER EXTREMITIES: ICD-10-CM

## 2022-11-08 DIAGNOSIS — D83.9 CVID (COMMON VARIABLE IMMUNODEFICIENCY) (H): Primary | ICD-10-CM

## 2022-11-08 DIAGNOSIS — R07.9 CHEST PAIN, UNSPECIFIED TYPE: ICD-10-CM

## 2022-11-08 LAB
BASOPHILS # BLD AUTO: 0 10E3/UL (ref 0–0.2)
BASOPHILS NFR BLD AUTO: 0 %
EOSINOPHIL # BLD AUTO: 0.2 10E3/UL (ref 0–0.7)
EOSINOPHIL NFR BLD AUTO: 3 %
ERYTHROCYTE [DISTWIDTH] IN BLOOD BY AUTOMATED COUNT: 13.4 % (ref 10–15)
ERYTHROCYTE [SEDIMENTATION RATE] IN BLOOD BY WESTERGREN METHOD: 7 MM/HR (ref 0–15)
HCT VFR BLD AUTO: 41.9 % (ref 40–53)
HGB BLD-MCNC: 14.5 G/DL (ref 13.3–17.7)
IMM GRANULOCYTES # BLD: 0 10E3/UL
IMM GRANULOCYTES NFR BLD: 0 %
LYMPHOCYTES # BLD AUTO: 2.7 10E3/UL (ref 0.8–5.3)
LYMPHOCYTES NFR BLD AUTO: 34 %
MCH RBC QN AUTO: 30.1 PG (ref 26.5–33)
MCHC RBC AUTO-ENTMCNC: 34.6 G/DL (ref 31.5–36.5)
MCV RBC AUTO: 87 FL (ref 78–100)
MONOCYTES # BLD AUTO: 0.4 10E3/UL (ref 0–1.3)
MONOCYTES NFR BLD AUTO: 6 %
NEUTROPHILS # BLD AUTO: 4.5 10E3/UL (ref 1.6–8.3)
NEUTROPHILS NFR BLD AUTO: 57 %
NRBC # BLD AUTO: 0 10E3/UL
NRBC BLD AUTO-RTO: 0 /100
PLATELET # BLD AUTO: 156 10E3/UL (ref 150–450)
RBC # BLD AUTO: 4.81 10E6/UL (ref 4.4–5.9)
WBC # BLD AUTO: 7.9 10E3/UL (ref 4–11)

## 2022-11-08 PROCEDURE — 99207 PR NO CHARGE LOS: CPT | Performed by: INTERNAL MEDICINE

## 2022-11-08 PROCEDURE — 86036 ANCA SCREEN EACH ANTIBODY: CPT | Performed by: INTERNAL MEDICINE

## 2022-11-08 PROCEDURE — 82784 ASSAY IGA/IGD/IGG/IGM EACH: CPT | Performed by: INTERNAL MEDICINE

## 2022-11-08 PROCEDURE — 36415 COLL VENOUS BLD VENIPUNCTURE: CPT | Performed by: INTERNAL MEDICINE

## 2022-11-08 PROCEDURE — 85025 COMPLETE CBC W/AUTO DIFF WBC: CPT | Performed by: INTERNAL MEDICINE

## 2022-11-08 PROCEDURE — 86160 COMPLEMENT ANTIGEN: CPT | Performed by: INTERNAL MEDICINE

## 2022-11-08 PROCEDURE — 86038 ANTINUCLEAR ANTIBODIES: CPT | Performed by: INTERNAL MEDICINE

## 2022-11-08 PROCEDURE — 86256 FLUORESCENT ANTIBODY TITER: CPT | Performed by: INTERNAL MEDICINE

## 2022-11-08 PROCEDURE — 85652 RBC SED RATE AUTOMATED: CPT | Performed by: INTERNAL MEDICINE

## 2022-11-08 RX ORDER — OXYCODONE HCL 10 MG/1
10 TABLET, FILM COATED, EXTENDED RELEASE ORAL EVERY 12 HOURS
COMMUNITY

## 2022-11-08 RX ORDER — NITROGLYCERIN 0.4 MG/1
TABLET SUBLINGUAL
Qty: 3 TABLET | Refills: 0 | Status: SHIPPED | OUTPATIENT
Start: 2022-11-08

## 2022-11-08 RX ORDER — ALPRAZOLAM 0.25 MG
0.25 TABLET ORAL 3 TIMES DAILY PRN
COMMUNITY

## 2022-11-08 ASSESSMENT — PAIN SCALES - GENERAL: PAINLEVEL: MODERATE PAIN (5)

## 2022-11-08 NOTE — Clinical Note
11/8/2022         RE: Vinay Washington  [address] [address]        Dear Colleague,    Thank you for referring your patient, Vinay Washington, to the [facility]. Please see a copy of my visit note below.    No notes on file    Again, thank you for allowing me to participate in the care of your patient.        Sincerely,        Benjy Ruffin MD

## 2022-11-08 NOTE — NURSING NOTE
"Oncology Rooming Note    November 8, 2022 3:12 PM   Vinay Washington is a 42 year old male who presents for:    Chief Complaint   Patient presents with     RECHECK     Initial Vitals: BP (!) 165/89 (BP Location: Right arm, Patient Position: Sitting, Cuff Size: Adult Regular)   Pulse 71   Wt 95.6 kg (210 lb 11.2 oz)   SpO2 98%   BMI 25.65 kg/m   Estimated body mass index is 25.65 kg/m  as calculated from the following:    Height as of 11/24/21: 1.93 m (6' 4\").    Weight as of this encounter: 95.6 kg (210 lb 11.2 oz). Body surface area is 2.26 meters squared.  Moderate Pain (5) Comment: Data Unavailable   No LMP for male patient.  Allergies reviewed: Yes  Medications reviewed: Yes    Medications: Medication refills not needed today.  Pharmacy name entered into Merku:    [facility] - [place], [place] - [address] [place], [place] - [address]    Clinical concerns: Recheck- intermittent HA and CP, fatigue.      Ally Mauricio RN              "

## 2022-11-09 LAB
ANA SER QL IF: NEGATIVE
ANCA AB PATTERN SER IF-IMP: NORMAL
C-ANCA TITR SER IF: NORMAL {TITER}
C3 SERPL-MCNC: 106 MG/DL (ref 81–157)
C4 SERPL-MCNC: 30 MG/DL (ref 13–39)
IGG SERPL-MCNC: 1433 MG/DL (ref 610–1616)

## 2022-11-09 NOTE — PATIENT INSTRUCTIONS
1) Rheum referral [place].  2) [facility] MG/OR 3 months (no labs at this time).    Benjy Ruffin MD.

## 2022-12-09 ENCOUNTER — TELEPHONE (OUTPATIENT)
Dept: ONCOLOGY | Facility: CLINIC | Age: 42
End: 2022-12-09

## 2022-12-09 NOTE — TELEPHONE ENCOUNTER
Reason for Call:  Other appointment    Detailed comments: Patient saw Dr. Ruffin on 11/08 and they discussed doing a higher dose of the Immune Globulin, Human, 8 GM/40ML SOLN but was delivered the same dose as normal? Please call him     Phone Number Patient can be reached at: Home number on file [phone] (home)    Best Time: any    Can we leave a detailed message on this number? YES    Call taken on 12/9/2022 at 3:07 PM by Rupali Mancera

## 2022-12-16 ENCOUNTER — OFFICE VISIT (OUTPATIENT)
Dept: RHEUMATOLOGY | Facility: CLINIC | Age: 42
End: 2022-12-16
Payer: COMMERCIAL

## 2022-12-16 ENCOUNTER — LAB (OUTPATIENT)
Dept: LAB | Facility: CLINIC | Age: 42
End: 2022-12-16
Payer: COMMERCIAL

## 2022-12-16 VITALS
SYSTOLIC BLOOD PRESSURE: 131 MMHG | OXYGEN SATURATION: 99 % | WEIGHT: 212 LBS | HEART RATE: 61 BPM | BODY MASS INDEX: 25.81 KG/M2 | DIASTOLIC BLOOD PRESSURE: 88 MMHG

## 2022-12-16 DIAGNOSIS — D83.9 CVID (COMMON VARIABLE IMMUNODEFICIENCY) (H): ICD-10-CM

## 2022-12-16 DIAGNOSIS — M25.50 POLYARTHRALGIA: ICD-10-CM

## 2022-12-16 DIAGNOSIS — M25.50 POLYARTHRALGIA: Primary | ICD-10-CM

## 2022-12-16 DIAGNOSIS — G89.4 CHRONIC PAIN SYNDROME: ICD-10-CM

## 2022-12-16 PROBLEM — G89.29 CHRONIC PAIN: Status: ACTIVE | Noted: 2022-12-16

## 2022-12-16 LAB
ALBUMIN SERPL BCG-MCNC: 4.5 G/DL (ref 3.5–5.2)
ALP SERPL-CCNC: 77 U/L (ref 40–129)
ALT SERPL W P-5'-P-CCNC: 20 U/L (ref 10–50)
ANION GAP SERPL CALCULATED.3IONS-SCNC: 8 MMOL/L (ref 7–15)
AST SERPL W P-5'-P-CCNC: 31 U/L (ref 10–50)
BILIRUB SERPL-MCNC: 0.2 MG/DL
BUN SERPL-MCNC: 14.1 MG/DL (ref 6–20)
CALCIUM SERPL-MCNC: 9.1 MG/DL (ref 8.6–10)
CHLORIDE SERPL-SCNC: 101 MMOL/L (ref 98–107)
CREAT SERPL-MCNC: 1.15 MG/DL (ref 0.67–1.17)
CRP SERPL-MCNC: <3 MG/L
DEPRECATED HCO3 PLAS-SCNC: 28 MMOL/L (ref 22–29)
ERYTHROCYTE [SEDIMENTATION RATE] IN BLOOD BY WESTERGREN METHOD: 6 MM/HR (ref 0–15)
GFR SERPL CREATININE-BSD FRML MDRD: 83 ML/MIN/1.73M2
GLUCOSE SERPL-MCNC: 99 MG/DL (ref 70–99)
POTASSIUM SERPL-SCNC: 4.4 MMOL/L (ref 3.4–5.3)
PROT SERPL-MCNC: 7.2 G/DL (ref 6.4–8.3)
SODIUM SERPL-SCNC: 137 MMOL/L (ref 136–145)

## 2022-12-16 PROCEDURE — 85652 RBC SED RATE AUTOMATED: CPT

## 2022-12-16 PROCEDURE — 80053 COMPREHEN METABOLIC PANEL: CPT

## 2022-12-16 PROCEDURE — 86431 RHEUMATOID FACTOR QUANT: CPT

## 2022-12-16 PROCEDURE — 36415 COLL VENOUS BLD VENIPUNCTURE: CPT

## 2022-12-16 PROCEDURE — 86200 CCP ANTIBODY: CPT

## 2022-12-16 PROCEDURE — 86235 NUCLEAR ANTIGEN ANTIBODY: CPT | Mod: 59

## 2022-12-16 PROCEDURE — 86140 C-REACTIVE PROTEIN: CPT

## 2022-12-16 PROCEDURE — 82306 VITAMIN D 25 HYDROXY: CPT

## 2022-12-16 PROCEDURE — 86235 NUCLEAR ANTIGEN ANTIBODY: CPT

## 2022-12-16 PROCEDURE — 99205 OFFICE O/P NEW HI 60 MIN: CPT | Performed by: STUDENT IN AN ORGANIZED HEALTH CARE EDUCATION/TRAINING PROGRAM

## 2022-12-16 NOTE — PROGRESS NOTES
Rheumatology Clinic Visit     Vinay Washington MRN# [id number]   YOB: 1980 Age: 42 year old     Date of Visit: Dec 16, 2022   Primary care provider: Liliana Rogers          Assessment and Plan:     Assessment     Chronic pain  Polyarthralgia - knees, shoulders  CVID on IVIG  Neg NIKHIL, ANCA, normal C3/C4, ESR     Mr. Washington is 42 year old male seen in clinic for evaluation of joint pains    Chronic pain: He reports having chronic pain in his body especially below trunk.  Pain is not only in hips, knees but also in his thighs, shins and bones.  He described pain as 5/10 intensity but with change in weather, it can be up to 10.  He uses ibuprofen 800 mg 3 times a day, Tylenol and oxycodone to help.  He has not noticed any swelling or redness over his joints.  He has stiffness in the morning especially in his legs.  Denies much discomfort in his hands.  He has history of bilateral ulnar nerve entrapment and had left ulnar decompression surgery done 8 years ago but still has symptoms on the right elbow.  He gets numbness in the right fourth and fifth fingers.  On exam he has no synovitis or tenderness over his joints except for mild tenderness over right third MCP joint.  No dactylitis, plantar fasciitis, Achilles tendinitis present.  Normal range of motion of his joints and normal muscle strength.    His physical exam is not consistent with inflammatory arthritis like RA or psoriatic arthritis.  Due to history of chronic pain, I will get x-ray of bilateral hands and knees to look for evidence of inflammatory arthritis. I will get inflammatory markers, rheumatoid serologies. Vitamin D level to rule out metabolic cause of pain.    25 to 30% patient's with CVID can have underlying autoimmune disease.  At present his symptoms are not consistent with inflammatory arthritis like RA which could be seen in CVID.  He reports mild sicca symptoms.  I will get SSA/SSB antibodies.  His NIKHIL checked before was  negative.  Likelihood of systemic lupus or other NIKHIL associated autoimmune connective tissue disease is low.    If his autoimmune work-up comes back normal then he will benefit from physical therapy, pool therapy, pain management.    Plan    Blood tests and X-rays ordered     No inflammation noted on joint exam.     You can continue pain medications and Tylenol, Ibuprofen as needed. Pool therapy will be helpful    Follow up as needed     -- Orders placed this encounter  Orders Placed This Encounter   Procedures     XR Hand Bilateral G/E 3 Views     XR Knee Bilateral 3 Views     Rheumatoid factor     Cyclic Citrullinated Peptide Antibody IgG     Erythrocyte sedimentation rate auto     CRP inflammation     SSA Ro RUBY Antibody IgG     SSB La RUBY Antibody IgG     Vitamin D Deficiency     Comprehensive metabolic panel     TT 60 min was spent on date of the encounter doing chart review, history and exam, documentation and further activities as noted above. Any prior notes, outside records, laboratory results, and imaging studies were reviewed if relevant.    Patient verbalized agreement with and understanding of the rationale for the diagnosis and treatment plan.  All questions were answered to best of my ability and the patient's satisfaction.      Chart documentation done in part with Dragon Voice recognition Software. Although reviewed after completion, some word and grammatical error may remain.              Active Problem List:     Patient Active Problem List    Diagnosis Date Noted     Chronic pain 12/16/2022     Priority: Medium     CVID (common variable immunodeficiency) (H) 12/16/2022     Priority: Medium            History of Present Illness:   Vinay Washington is a 42 year old male with PMH of CVID, chronic pain seen in the clinic in consultation at request of Dr Ruffin for evaluation of joint pains.    He reports having chronic pain for almost 7 years.  Pain is mostly below trunk.  He has noticed  discomfort in his shoulders occasionally but majority of his pain is in his thighs, shins, hips and knees.  He feels stiffness in his body in the morning.  He denies any swelling or redness over his joints.  No history of knee or ankle swelling.  Pain is 5/10 in intensity but with change in weather it can go up to 10/10 in intensity.  He had left ulnar nerve decompression surgery more than 5 years ago.  Now he has symptoms of numbness tingling in his right fourth and fifth finger related to ulnar nerve entrapment but he does not want to do surgery.  He uses Tylenol, ibuprofen 800 mg 3 times a day and oxycodone on a daily basis which helps.  He has used on and off steroids for bronchitis and has noticed some benefit in joint pains on it.  He has history of CVID which was diagnosed 3 years ago.  He had recurrent upper respiratory tract infections, about 50 infections in a year.  After diagnosis he has been on IVIG subcu injections which has helped.  Rates of infection has reduced to 8-12 a year.      He also reports having chronic headaches and insomnia.  He had sleep study done many years ago which was normal.  He denies having restless leg syndrome or sleep apnea. He has indigestion all the time, stomach pain, GERD. His brother has Crohn's disease.     No history of psoriasis, ulcerative colitis or chron's disease. No h/o iritis, enthesitis, finger or toe swelling like dactylitis, plantar fascitis or heel pain. Denies any raynauds, malar rash, photosensitivity, recurrent mouth/genital ulcers, pleuritic chest pains, recurrent sinusitis/rhinitis, swallowing difficulty, hearing or visual changes recently. No h/o arterial/venous thrombosis in the past. Denies any tick bite, recent GI/ infection.             Review of Systems:     Review Of Systems  Constitutional: denies fever, chills, night sweats and weight loss.  Skin: No skin rash.  Eyes: + dryness or irritation in eyes. No episode of eye inflammation or redness.    Ears/Nose/Throat: no recurrent sinus infections.  Respiratory: No shortness of breath, dyspnea on exertion, cough, or hemoptysis  Cardiovascular: no chest pain or palpitations.  Gastrointestinal: no nausea, vomiting, abdominal pain.  Normal bowel movements.  Genitourinary: no dysuria, frequency  or hematuria.  Musculoskeletal: as in HPI  Neurologic: + numbness, tingling.  Psychiatric: no mood disorders.  Hematologic/Lymphatic/Immunologic: no history of easy bruising, petechia or purpura.  No abnormal bleeding.   Endocrine: no h/o thyroid disease or Diabetes.                  Past Medical History:     Past Medical History:   Diagnosis Date     Chronic pain      CVID (common variable immunodeficiency) (H)      Past Surgical History:   Procedure Laterality Date     NO HISTORY OF SURGERY              Social History:     Social History     Occupational History     Not on file   Tobacco Use     Smoking status: Every Day     Packs/day: 0.50     Types: Cigarettes     Smokeless tobacco: Never   Substance and Sexual Activity     Alcohol use: Not Currently     Drug use: Not on file     Sexual activity: Not on file            Family History:     Family History   Problem Relation Age of Onset     Autoimmune Disease No family hx of             Allergies:     Allergies   Allergen Reactions     Penicillins      Doxycycline Other (See Comments)     Funny feeling in head when on medication     Morphine Itching            Medications:     Current Outpatient Medications   Medication Sig Dispense Refill     ALPRAZolam (XANAX) 0.25 MG tablet Take 0.25 mg by mouth 3 times daily as needed for anxiety       aspirin (ASA) 81 MG EC tablet Take 1 tablet (81 mg) by mouth daily 30 tablet 0     azithromycin (ZITHROMAX) 250 MG tablet Take QD x 3 days/week       citalopram (CELEXA) 20 MG tablet Take 20 mg by mouth       Immune Globulin, Human, 8 GM/40ML SOLN Inject 16 g Subcutaneous once a week 320 mL 11     nitroGLYcerin (NITROSTAT) 0.4 MG  sublingual tablet For chest pain place 1 tablet under the tongue every 5 minutes for 3 doses. If symptoms persist 5 minutes after 1st dose call 911. 3 tablet 0     oxyCODONE (OXYCONTIN) 10 MG 12 hr tablet Take 10 mg by mouth every 12 hours       tiZANidine (ZANAFLEX) 4 MG tablet Take 8 mg by mouth       traZODone (DESYREL) 150 MG tablet Take 300 mg by mouth              Physical Exam:   Blood pressure 131/88, pulse 61, weight 96.2 kg (212 lb), SpO2 99 %.  Wt Readings from Last 4 Encounters:   12/16/22 96.2 kg (212 lb)   11/08/22 95.6 kg (210 lb 11.2 oz)   11/24/21 92.5 kg (204 lb)       Constitutional:Moderately build, appearing stated age; cooperative  Eyes: nl EOM, PERRLA, vision, conjunctiva, sclera  ENT: nl external ears, nose, hearing, lips, teeth, gums, throat  No mucous membrane lesions, normal saliva pool  Neck: no mass or thyroid enlargement  Resp: lungs clear to auscultation, nl to palpation  CV: RRR, no murmurs, rubs or gallops, no edema  GI: no ABD mass or tenderness, no HSM  : not tested  Lymph: no cervical, supraclavicular, inguinal or epitrochlear nodes    MS: All TMJ, neck, shoulder, elbow, wrist, MCP/PIP/DIP, spine, hip, knee, ankle, and foot MTP/IP joints were examined.   -- No active synovitis or deformity. Full ROM.  Normal  strength.   -- No dactylitis,  tenosynovitis, enthesopathy.    Skin: no nail pitting, alopecia, rash, nodules or lesions  Neuro: nl cranial nerves, strength, sensation, DTRs.   Psych: nl judgement, orientation, memory, affect.         Data:     No results found for any visits on 12/16/22.    Recent Labs   Lab Test 11/08/22  1621 11/24/21  1545   WBC 7.9 6.4   RBC 4.81 5.07   HGB 14.5 15.2   HCT 41.9 44.5   MCV 87 88   RDW 13.4 13.3    144*      Recent Labs   Lab Test 11/24/21  1545   TSH 0.53     Hemoglobin   Date Value Ref Range Status   11/08/2022 14.5 13.3 - 17.7 g/dL Final   11/24/2021 15.2 13.3 - 17.7 g/dL Final     Erythrocyte Sedimentation Rate   Date  Value Ref Range Status   11/08/2022 7 0 - 15 mm/hr Final     Recent Labs   Lab Test 11/08/22  1621 11/24/21  1545   WBC 7.9 6.4   HGB 14.5 15.2   HCT 41.9 44.5   MCV 87 88    144*   TSH  --  0.53       Reviewed Rheumatology lab flowsheet    Randy Sumner MD  [facility]  Department of Rheumatology & Autoimmune Disorders  [facility]: [phone]   Pager - [phone]

## 2022-12-16 NOTE — PATIENT INSTRUCTIONS
Blood tests and X-rays ordered     No inflammation noted on joint exam.     You can continue pain medications and Tylenol, Ibuprofen as needed. Pool therapy will be helpful    Follow up as needed

## 2022-12-18 LAB — DEPRECATED CALCIDIOL+CALCIFEROL SERPL-MC: 32 UG/L (ref 20–75)

## 2022-12-19 LAB — RHEUMATOID FACT SER NEPH-ACNC: <6 IU/ML

## 2022-12-20 LAB
CCP AB SER IA-ACNC: 1.5 U/ML
ENA SS-A AB SER IA-ACNC: 3.3 U/ML
ENA SS-A AB SER IA-ACNC: NEGATIVE
ENA SS-B IGG SER IA-ACNC: 0.6 U/ML
ENA SS-B IGG SER IA-ACNC: NEGATIVE

## 2023-02-23 ENCOUNTER — VIRTUAL VISIT (OUTPATIENT)
Dept: ONCOLOGY | Facility: CLINIC | Age: 43
End: 2023-02-23
Attending: INTERNAL MEDICINE
Payer: COMMERCIAL

## 2023-02-23 DIAGNOSIS — D83.9 CVID (COMMON VARIABLE IMMUNODEFICIENCY) (H): Primary | ICD-10-CM

## 2023-02-23 PROCEDURE — 99442 PR PHYSICIAN TELEPHONE EVALUATION 11-20 MIN: CPT | Mod: 93 | Performed by: INTERNAL MEDICINE

## 2023-02-23 NOTE — PROGRESS NOTES
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    Klaus remains fatigued.  Prior testing has been reassuring from an occult malignancy standpoint outside of bone marrow biopsy.  He has been evaluated by rheumatology with a fairly reassuring evaluation.  We will plan to see him back in about 6 months time.    Benjy Ruffin MD.  Total time 11 mins.    --------------------    Interval History:  Vinay returns for follow up of combined variable immunodeficiency.  He has an active physical job.  He wishes he could do more from an energy standpoint.  He has aches all over.    --------------------    Physical Exam:  Phone visit.    Labs / Imaging / Path:  No new labs or imaging.

## 2023-02-23 NOTE — Clinical Note
2023         RE: Vinay Washington  [address] [place] [address]        Dear Colleague,    Thank you for referring your patient, Vinay Washington, to the [facility] [place]. Please see a copy of my visit note below.    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    Vinay and I reviewed that is likely in a difficult spot.  Minus some mild thrombocytopenia, his blood counts are reassuring.  Thyroid function is normal.  Peripheral blood smear is reassuring.  EKG has been reassuring.  Flow cytometry of the blood has been reassuring.  His exam shows no signs of lymphoproliferative disorders.  Past imaging has been in agreement.  I do suspect that his fatigue is likely secondary to his combined variable immunodeficiency.  He is a very active gentleman with a physically demanding/stressful job and I think he is likely more susceptible to noticing fatigue and others.  He is sleeping well.  He is eating well.  He is taking good care of himself physically.  For now we will continue to monitor and watch for symptoms.  We are also taking over control of his gammaglobulin injections.    Benjy Ruffin MD.  Total time 14 mins reviewing labs.    --------------------    Interval History:  Vinay returns for follow up of combined variable immunodeficiency.  No major changes since her last health visit.  He continues to sleep well.  He does feel well rested but runs out of energy during the workday.  Eating right.  Exercising.  No major stresses.  Majority of time spent reviewing labs..    --------------------    Physical Exam:  Phone visit.    Labs / Imaging / Path:  We reviewed labs, personally reviewed imaging and reviewed pathology  reports      Again, thank you for allowing me to participate in the care of your patient.        Sincerely,        Benjy Ruffin MD

## 2023-02-23 NOTE — Clinical Note
2023         RE: Vinay Washington  [address] [place] [id number]        Dear Colleague,    Thank you for referring your patient, Vinay Washington, to the [facility] [place]. Please see a copy of my visit note below.    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    Vinay and I reviewed that is likely in a difficult spot.  Minus some mild thrombocytopenia, his blood counts are reassuring.  Thyroid function is normal.  Peripheral blood smear is reassuring.  EKG has been reassuring.  Flow cytometry of the blood has been reassuring.  His exam shows no signs of lymphoproliferative disorders.  Past imaging has been in agreement.  I do suspect that his fatigue is likely secondary to his combined variable immunodeficiency.  He is a very active gentleman with a physically demanding/stressful job and I think he is likely more susceptible to noticing fatigue and others.  He is sleeping well.  He is eating well.  He is taking good care of himself physically.  For now we will continue to monitor and watch for symptoms.  We are also taking over control of his gammaglobulin injections.    Benjy Ruffin MD.  Total time 14 mins reviewing labs.    --------------------    Interval History:  Vinay returns for follow up of combined variable immunodeficiency.  No major changes since her last health visit.  He continues to sleep well.  He does feel well rested but runs out of energy during the workday.  Eating right.  Exercising.  No major stresses.  Majority of time spent reviewing labs..    --------------------    Physical Exam:  Phone visit.    Labs / Imaging / Path:  We reviewed labs, personally reviewed imaging and reviewed pathology  reports      Again, thank you for allowing me to participate in the care of your patient.        Sincerely,        Benjy Ruffin MD

## 2023-04-03 ENCOUNTER — TELEPHONE (OUTPATIENT)
Dept: ONCOLOGY | Facility: CLINIC | Age: 43
End: 2023-04-03
Payer: COMMERCIAL

## 2023-04-03 NOTE — TELEPHONE ENCOUNTER
Reason for Call:  Other Prior Authorization     Detailed comments: Minesh ESCOBEDO from prior authorization team is calling to provide an update on the decision for the medication Cuvitru 16 grams. Authorization is approved, authorization number [id number] - dates of approval are 03/31/23-03/29/24, a letter of approval will be faxed to the providers office,    Phone Number Patient can be reached at: Other phone number:  [phone]*    Best Time:     Can we leave a detailed message on this number? NO    Call taken on 4/3/2023 at 4:21 PM by Sarina Rodríguez

## 2023-09-02 ENCOUNTER — NURSE TRIAGE (OUTPATIENT)
Dept: NURSING | Facility: CLINIC | Age: 43
End: 2023-09-02
Payer: COMMERCIAL

## 2023-09-02 NOTE — TELEPHONE ENCOUNTER
Nurse Triage SBAR    Is this a 2nd Level Triage? NO    Situation: Pt is calling to ask if he should continue Q weekly Immune Globulin subcutaneous injections due to becoming ill over this past week.    Background: Per pt he takes   Immune Globulin injection subcutaneous weekly, for immune condition. Per pt he last took medication on 08/27/2023. Per pt he hardly gets sick and has been taking medication for a couple years now. Per pt, over the last week he has not been feeling unwell and wants to know if he should not take dose that is due 09/03/2023.Per pt he is a patient of [facility] at [place]. At 8:59 AM, answering service for [place] is called, per Dr. Olena Calabrese is on call and paged at 9:00 AM. Olena Ewing called back at 9:18 AM and verbalized he is not covering. At 9:19 AM answering service is called again and Ashley ramírez answering verbalized, Dr. Jessica Mckeon is covering and paged.  Assessment: Pt is triaged for fever complaint as biggest concern per pt.  Per pt fever was 102.0 F, a couple days ago, fever has been .7 F, the last couple days. Pt is asked and is not able to take temperature at this time. Per pt he has also been having some stomach pain.       Protocol Recommended Disposition:   See HCP Within 4 Hours (Or PCP Triage). Pt is informed that disposition for his fever complaint and weakened immune system is that he needs to be seen in four hours or PCP/covering service can be informed for recommendations. Pt verbalized to call PCP.   Recommendation: At 9:27 AM, Dr. Jessica Mckeon called back and verbalized the following:for pt to be seen at [facility] for fever/work up, go to ED if unable to get seen at [facility] and hold/don't take Immune Globulin injection subcutaneous injection on 09/03/2023. At 09:39 AM, pt's phone number for call back is called, unable to leave a voicemail, per recording VM not setup. Pt called again at 9:54 AM, call is not answered, VM  not set up per recording.   Provider Recommendation Follow Up:   Unable to reach patient/caregiver. Left message to return call to Unable to leave message.            Does the patient meet one of the following criteria for ADS visit consideration? No    Reason for Disposition   [1] Fever > 100.0 F (37.8 C) AND [2] diabetes mellitus or weak immune system (e.g., HIV positive, cancer chemo, splenectomy, organ transplant, chronic steroids)    Additional Information   Negative: Shock suspected (e.g., cold/pale/clammy skin, too weak to stand, low BP, rapid pulse)   Negative: Difficult to awaken or acting confused (e.g., disoriented, slurred speech)   Negative: [1] Difficulty breathing AND [2] bluish lips, tongue or face   Negative: New-onset rash with multiple purple (or blood-colored) spots or dots   Negative: Sounds like a life-threatening emergency to the triager   Negative: [1] Headache AND [2] stiff neck (can't touch chin to chest)   Negative: Difficulty breathing   Negative: IV Drug Use (IVDU)   Negative: [1] Drinking very little AND [2] dehydration suspected (e.g., no urine > 12 hours, very dry mouth, very lightheaded)   Negative: Widespread rash and cause unknown   Negative: Patient sounds very sick or weak to the triager  (Exception: Mild weakness and hasn't taken fever medicine.)   Negative: Fever > 104 F (40 C)   Negative: [1] Fever > 101 F (38.3 C) AND [2] age > 60 years   Negative: [1] Fever > 100.0 F (37.8 C) AND [2] bedridden (e.g., CVA, chronic illness, recovering from surgery)   Negative: [1] Fever > 100.0 F (37.8 C) AND [2] indwelling urinary catheter (e.g., Garber, Coude)   Negative: [1] Fever > 100.0 F (37.8 C) AND [2] has port (portacath), central line, or PICC line   Negative: [1] Fever > 100.0 F (37.8 C) AND [2] surgery in the last month    Protocols used: Fever-A-AH

## 2023-12-21 ENCOUNTER — LAB (OUTPATIENT)
Dept: LAB | Facility: CLINIC | Age: 43
End: 2023-12-21
Payer: COMMERCIAL

## 2023-12-21 ENCOUNTER — ONCOLOGY VISIT (OUTPATIENT)
Dept: ONCOLOGY | Facility: CLINIC | Age: 43
End: 2023-12-21
Attending: INTERNAL MEDICINE
Payer: COMMERCIAL

## 2023-12-21 VITALS
BODY MASS INDEX: 26.56 KG/M2 | RESPIRATION RATE: 16 BRPM | OXYGEN SATURATION: 97 % | TEMPERATURE: 98.7 F | DIASTOLIC BLOOD PRESSURE: 86 MMHG | HEART RATE: 83 BPM | WEIGHT: 218.19 LBS | SYSTOLIC BLOOD PRESSURE: 150 MMHG

## 2023-12-21 DIAGNOSIS — D83.9 CVID (COMMON VARIABLE IMMUNODEFICIENCY) (H): Primary | ICD-10-CM

## 2023-12-21 DIAGNOSIS — D83.9 CVID (COMMON VARIABLE IMMUNODEFICIENCY) (H): ICD-10-CM

## 2023-12-21 DIAGNOSIS — R14.0 ABDOMINAL BLOATING: ICD-10-CM

## 2023-12-21 LAB
BASOPHILS # BLD AUTO: 0 10E3/UL (ref 0–0.2)
BASOPHILS NFR BLD AUTO: 0 %
EOSINOPHIL # BLD AUTO: 0.2 10E3/UL (ref 0–0.7)
EOSINOPHIL NFR BLD AUTO: 3 %
ERYTHROCYTE [DISTWIDTH] IN BLOOD BY AUTOMATED COUNT: 13.5 % (ref 10–15)
ERYTHROCYTE [SEDIMENTATION RATE] IN BLOOD BY WESTERGREN METHOD: 6 MM/HR (ref 0–15)
FERRITIN SERPL-MCNC: 92 NG/ML (ref 31–409)
HCT VFR BLD AUTO: 42.1 % (ref 40–53)
HGB BLD-MCNC: 14.2 G/DL (ref 13.3–17.7)
IMM GRANULOCYTES # BLD: 0 10E3/UL
IMM GRANULOCYTES NFR BLD: 0 %
LYMPHOCYTES # BLD AUTO: 3 10E3/UL (ref 0.8–5.3)
LYMPHOCYTES NFR BLD AUTO: 33 %
MCH RBC QN AUTO: 29.8 PG (ref 26.5–33)
MCHC RBC AUTO-ENTMCNC: 33.7 G/DL (ref 31.5–36.5)
MCV RBC AUTO: 88 FL (ref 78–100)
MONOCYTES # BLD AUTO: 0.8 10E3/UL (ref 0–1.3)
MONOCYTES NFR BLD AUTO: 9 %
NEUTROPHILS # BLD AUTO: 5.1 10E3/UL (ref 1.6–8.3)
NEUTROPHILS NFR BLD AUTO: 55 %
NRBC # BLD AUTO: 0 10E3/UL
NRBC BLD AUTO-RTO: 0 /100
PLATELET # BLD AUTO: 172 10E3/UL (ref 150–450)
RBC # BLD AUTO: 4.76 10E6/UL (ref 4.4–5.9)
TSH SERPL DL<=0.005 MIU/L-ACNC: 2.83 UIU/ML (ref 0.3–4.2)
VIT B12 SERPL-MCNC: 759 PG/ML (ref 232–1245)
WBC # BLD AUTO: 9.2 10E3/UL (ref 4–11)

## 2023-12-21 PROCEDURE — 84443 ASSAY THYROID STIM HORMONE: CPT | Performed by: INTERNAL MEDICINE

## 2023-12-21 PROCEDURE — 82728 ASSAY OF FERRITIN: CPT | Performed by: INTERNAL MEDICINE

## 2023-12-21 PROCEDURE — 85652 RBC SED RATE AUTOMATED: CPT | Performed by: INTERNAL MEDICINE

## 2023-12-21 PROCEDURE — 82607 VITAMIN B-12: CPT | Performed by: INTERNAL MEDICINE

## 2023-12-21 PROCEDURE — 85025 COMPLETE CBC W/AUTO DIFF WBC: CPT | Performed by: INTERNAL MEDICINE

## 2023-12-21 PROCEDURE — 99214 OFFICE O/P EST MOD 30 MIN: CPT | Performed by: INTERNAL MEDICINE

## 2023-12-21 PROCEDURE — 36415 COLL VENOUS BLD VENIPUNCTURE: CPT

## 2023-12-21 PROCEDURE — 82784 ASSAY IGA/IGD/IGG/IGM EACH: CPT | Performed by: INTERNAL MEDICINE

## 2023-12-21 RX ORDER — ONDANSETRON 8 MG/1
8 TABLET, FILM COATED ORAL
COMMUNITY
Start: 2023-12-01 | End: 2024-11-30

## 2023-12-21 RX ORDER — SILDENAFIL 100 MG/1
100 TABLET, FILM COATED ORAL
COMMUNITY
Start: 2023-12-04 | End: 2024-12-03

## 2023-12-21 RX ORDER — PANTOPRAZOLE SODIUM 40 MG/1
40 TABLET, DELAYED RELEASE ORAL
COMMUNITY
Start: 2023-11-10 | End: 2024-11-09

## 2023-12-21 ASSESSMENT — PAIN SCALES - GENERAL: PAINLEVEL: MODERATE PAIN (5)

## 2023-12-21 NOTE — LETTER
"    2023         RE: Vinay Washington  [address] [place] [id number]        Dear Colleague,    Thank you for referring your patient, Vinay Washington, to the [facility]. Please see a copy of my visit note below.    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  Will await GI evaluation; at risk for a number of UGI and small bowel complications from CVID.  Anticipate hydrogen breath test and EGD will be completed.  Awaiting IgG levels, but remainder of testing (CBC, B12, ferritin, ESR) stable.  RTC 6 months.    Benjy Ruffin MD    --------------------    Interval History:  Klaus returns for follow-up of CVID unaccompanied.  Fatigue stable.  Abdominal bloating triggered by eating; consistent w/ all kinds of foods; pressure pain from bloating; radiates to back; lasts about 30 mins; no major urinary issues; bowels are unchanged; colonoscopy normal; uses ibuprofen four to eight 200 tablets per day for abdominal pain; remains on protonix.  Changed homes and sinus infections have stopped.  Subcutaneous IVIG runs out Friday/Saturday (administers on ).    Social History:  Social History     Tobacco Use     Smoking status: Every Day     Packs/day: .75     Types: Cigarettes     Smokeless tobacco: Never   Substance Use Topics     Alcohol use: Not Currently     Family History:  Family History   Problem Relation Age of Onset     Autoimmune Disease No family hx of      Immunizations:  Immunization History   Administered Date(s) Administered     COVID-19 Monovalent 18+ (Moderna) 2021, 2021, 2021     Influenza Vaccine >6 months,quad, PF 2016, 2022     Pneumo Conj 13-V (2010&after) 2020     Pneumococcal 23 " valent 10/30/2019     Health Maintenance Due   Topic Date Due     HEPATITIS B IMMUNIZATION (1 of 3 - 3-dose series) Never done     COVID-19 Vaccine (4 - 2023-24 season) 09/01/2023     --------------------    Physical Exam:  VS: BP (!) 150/86 (BP Location: Right arm, Patient Position: Sitting, Cuff Size: Adult Regular)   Pulse 83   Temp 98.7  F (37.1  C) (Temporal)   Resp 16   Wt 99 kg (218 lb 3 oz)   SpO2 97%   BMI 26.56 kg/m  .  GEN: Well appearing.    Labs / Imaging:  Reviewed B12, CBC, ferritin, ESR, IgG.  Reviewed outside CT AP.      Again, thank you for allowing me to participate in the care of your patient.        Sincerely,        Benjy Ruffin MD

## 2023-12-21 NOTE — PROGRESS NOTES
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  Will await GI evaluation; at risk for a number of UGI and small bowel complications from CVID.  Anticipate hydrogen breath test and EGD will be completed.  Awaiting IgG levels, but remainder of testing (CBC, B12, ferritin, ESR) stable.  RTC 6 months.    Benjy Ruffin MD    --------------------    Interval History:  Klaus returns for follow-up of CVID unaccompanied.  Fatigue stable.  Abdominal bloating triggered by eating; consistent w/ all kinds of foods; pressure pain from bloating; radiates to back; lasts about 30 mins; no major urinary issues; bowels are unchanged; colonoscopy normal; uses ibuprofen four to eight 200 tablets per day for abdominal pain; remains on protonix.  Changed homes and sinus infections have stopped.  Subcutaneous IVIG runs out Friday/Saturday (administers on ).    Social History:  Social History     Tobacco Use    Smoking status: Every Day     Packs/day: .75     Types: Cigarettes    Smokeless tobacco: Never   Substance Use Topics    Alcohol use: Not Currently     Family History:  Family History   Problem Relation Age of Onset    Autoimmune Disease No family hx of      Immunizations:  Immunization History   Administered Date(s) Administered    COVID-19 Monovalent 18+ (Moderna) 2021, 2021, 2021    Influenza Vaccine >6 months,quad, PF 2016, 2022    Pneumo Conj 13-V (2010&after) 2020    Pneumococcal 23 valent 10/30/2019     Health Maintenance Due   Topic Date Due    HEPATITIS B IMMUNIZATION (1 of 3 - 3-dose series) Never done    COVID-19 Vaccine ( season) 2023     --------------------    Physical Exam:  VS: BP (!) 150/86 (BP Location: Right arm, Patient " Position: Sitting, Cuff Size: Adult Regular)   Pulse 83   Temp 98.7  F (37.1  C) (Temporal)   Resp 16   Wt 99 kg (218 lb 3 oz)   SpO2 97%   BMI 26.56 kg/m  .  GEN: Well appearing.    Labs / Imaging:  Reviewed B12, CBC, ferritin, ESR, IgG.  Reviewed outside CT AP.

## 2023-12-21 NOTE — NURSING NOTE
Vinay to follow up with Primary Care provider regarding elevated blood pressure.  Joaquina GUEVARA [facility]  [phone]

## 2023-12-22 LAB — IGG SERPL-MCNC: 1332 MG/DL (ref 610–1616)

## 2024-03-27 ENCOUNTER — TELEPHONE (OUTPATIENT)
Dept: SURGERY | Facility: CLINIC | Age: 44
End: 2024-03-27
Payer: COMMERCIAL

## 2024-03-27 NOTE — TELEPHONE ENCOUNTER
"Faxed received from [facility] requesting refill on cuvitru for skilled nursing to administer.  The request is also for anaphylaxis orders for subcutaneous epinephrine autoinjector, acetaminophen 650 mg by mouth 30 minutes before infusion, diphenhydramine 25 mg 30 minutes before infusion. Patient has appointment 6/6/24 with labs. Patient was last seen by Dr. 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  Will await GI evaluation; at risk for a number of UGI and small bowel complications from CVID.  Anticipate hydrogen breath test and EGD will be completed.  Awaiting IgG levels, but remainder of testing (CBC, B12, ferritin, ESR) stable.  RTC 6 months.     Benjy Ruffin MD\"    Order signed and faxed.    Mylene Rader RN on 3/27/2024 at 9:05 AM    "

## 2024-06-17 ENCOUNTER — TELEPHONE (OUTPATIENT)
Dept: ONCOLOGY | Facility: CLINIC | Age: 44
End: 2024-06-17
Payer: COMMERCIAL

## 2024-06-17 NOTE — TELEPHONE ENCOUNTER
Would request more guidance from primary on need for earlier follow-up.    Or offer earlier JOLIE follow-up.    Benjy Ruffin MD.

## 2024-06-17 NOTE — TELEPHONE ENCOUNTER
Patient reports he's been having a fever and achy joints for the last 30 days. He was seen today by Primary, [facility]. Had blood work and CXR done with (per patient) negative / normal results thus far. Some labs are still pending. He was advised to follow up with Oncology. He is looking for recommendations.     Care Everywhere has been updated.    Per chart review, patient is scheduled for follow up on 7/18/24.    Routing to provider for further review and recommendations.     Ally Mauricio RN, BSN, PHN, OCN  [facility]

## 2024-06-18 NOTE — TELEPHONE ENCOUNTER
Writer called patient, left a message to call back.    Ally Mauricio RN, BSN, PHN  [facility]

## 2024-06-18 NOTE — TELEPHONE ENCOUNTER
Patient called back. Discussed message below, appointment scheduled. Discussed sx that would require emergent care, understanding verbalized. He agrees to be further evaluated in the ED if he develops any new or worsening sx.     Ally Mauricio RN, BSN, PHN, OCN  [facility]

## 2024-06-18 NOTE — TELEPHONE ENCOUNTER
Called patient to offer appointment with JOLIE. Results have been updated in Care Everywhere, left message to call back.     Ally Mauricio RN, BSN, PHN, OCN  [facility]

## 2024-06-21 NOTE — PROGRESS NOTES
Oncology Follow Up Visit: June 24, 2024    Oncologist: Dr Benjy Ruffin  PCP: Liliana Rogers    Diagnosis: CVID  Vinay Washington is a 43 yo male who was initially seen in this clinic in late 2021 for diagnosis of CVID. Has had BMBX and review by GI   Treatment:   subcutaneous IVIG dosing 24 g weekly    Interval History:  Mr Washington comes to clinic alone for review of fevers. He was seen in ED on 6/17 and a workup was completed by ED for review however no source was found and fevers are intermittent. They started 40 days ago and can go as high as 102- 104 initiallyover 3 days He was given Levaquin by ENT which initially helped but fevers then returned but not as elevated and intermittent.   He denies cold symptoms - congestion, headaches, colored drainage, sore throat or cough or ear pains or fullness at this time. Bowel and bladder are normal at this time   He still continues with abdominal pains - using pantoprozole and did start odanestron which he feels is helpful- uses Oxycontin 10 mg po daily  Takes azithromycin 3 days per week and receives IVIG at home weekly  Rest of comprehensive and complete ROS is reviewed and is negative.   Past Medical History:   Diagnosis Date    Chronic pain     CVID (common variable immunodeficiency) (H)      Current Outpatient Medications   Medication Sig Dispense Refill    ALPRAZolam (XANAX) 0.25 MG tablet Take 0.25 mg by mouth 3 times daily as needed for anxiety      azithromycin (ZITHROMAX) 250 MG tablet Take QD x 3 days/week      citalopram (CELEXA) 20 MG tablet Take 20 mg by mouth      Immune Globulin, Human, 8 GM/40ML SOLN Inject 24 g Subcutaneous once a week 320 mL 11    ondansetron (ZOFRAN) 8 MG tablet Take 8 mg by mouth      oxyCODONE (OXYCONTIN) 10 MG 12 hr tablet Take 10 mg by mouth every 12 hours      pantoprazole (PROTONIX) 40 MG EC tablet Take 40 mg by mouth      sildenafil (VIAGRA) 100 MG tablet Take 100 mg by mouth      tiZANidine (ZANAFLEX) 4 MG tablet Take 8 mg  by mouth      traZODone (DESYREL) 150 MG tablet Take 300 mg by mouth      aspirin (ASA) 81 MG EC tablet Take 1 tablet (81 mg) by mouth daily (Patient not taking: Reported on 12/21/2023) 30 tablet 0    nitroGLYcerin (NITROSTAT) 0.4 MG sublingual tablet For chest pain place 1 tablet under the tongue every 5 minutes for 3 doses. If symptoms persist 5 minutes after 1st dose call 911. (Patient not taking: Reported on 2/23/2023) 3 tablet 0     No current facility-administered medications for this visit.     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    Physical Exam:/81 (BP Location: Right arm)   Pulse 76   Temp 98.6  F (37  C) (Oral)   Resp 18   Wt 98 kg (216 lb)   SpO2 98%   BMI 26.29 kg/m     ECOG PS- 0  Constitutional: Alert, cooperative and in no distress. Obviously frustrated by recurrent fevers.   ENT: Eyes bright , No mouth sores, throat redness. Ears without redness or change in TMs.   Neck: Supple, No adenopathy.  Cardiac: Heart rate and rhythm is regular and strong without murmur  Respiratory: Breathing easy. Lung sounds clear to auscultation- no cough  GI: Abdomen is soft, non-tender, BS normal. No masses or organomegaly  MS: Muscle tone normal, extremities normal with no edema.   Skin: No suspicious lesions or rashes- no open sores   Neuro: Sensory grossly WNL, gait normal.   Lymph: Normal ant/post cervical, axillary, supraclavicular nodes  Psych: Mentation appears normal and affect normal/bright and smiling    Laboratory/Imaging Results:   Results for orders placed or performed in visit on 06/24/24   IgG     Status: Abnormal   Result Value Ref Range    Immunoglobulin G 1,617 (H) 610 - 1,616 mg/dL   T cell subset profile     Status: Abnormal   Result Value Ref  Range    CD3% Total T Cells 85 (H) 49 - 84 %    Absolute CD3, Total T Cells 2,359 603 - 2,990 cells/uL    CD4% [place] T Cells 62 28 - 63 %    Absolute CD4, [place] T Cells 1,715 441 - 2,156 cells/uL    CD8% Suppressor T Cells 30 10 - 40 %    Absolute CD8, Suppressor T Cells 824 125 - 1,312 cells/uL    CD4:CD8 Ratio 2.08 1.40 - 2.60    T Cell Comment     CBC with platelets and differential     Status: None   Result Value Ref Range    WBC Count 8.3 4.0 - 11.0 10e3/uL    RBC Count 4.75 4.40 - 5.90 10e6/uL    Hemoglobin 14.2 13.3 - 17.7 g/dL    Hematocrit 41.7 40.0 - 53.0 %    MCV 88 78 - 100 fL    MCH 29.9 26.5 - 33.0 pg    MCHC 34.1 31.5 - 36.5 g/dL    RDW 13.7 10.0 - 15.0 %    Platelet Count 158 150 - 450 10e3/uL    % Neutrophils 56 %    % Lymphocytes 33 %    % Monocytes 7 %    % Eosinophils 4 %    % Basophils 0 %    % Immature Granulocytes 0 %    NRBCs per 100 WBC 0 <1 /100    Absolute Neutrophils 4.6 1.6 - 8.3 10e3/uL    Absolute Lymphocytes 2.7 0.8 - 5.3 10e3/uL    Absolute Monocytes 0.6 0.0 - 1.3 10e3/uL    Absolute Eosinophils 0.3 0.0 - 0.7 10e3/uL    Absolute Basophils 0.0 0.0 - 0.2 10e3/uL    Absolute Immature Granulocytes 0.0 <=0.4 10e3/uL    Absolute NRBCs 0.0 10e3/uL   CBC with Platelets & Differential     Status: None    Narrative    The following orders were created for panel order CBC with Platelets & Differential.  Procedure                               Abnormality         Status                     ---------                               -----------         ------                     CBC with platelets and d...[[id number]]                      Final result                 Please view results for these tests on the individual orders.   Reviewed results from ED visit on 6/17 including cbc, cmp C reactive protein, ESR, anaplasma Phagocytophilum AB IGG, Ehrlichia AB profile, TIck borne disease Profile IGG, Babesia microti ABS IGG, and Ehlischia Chaffeensis.       Assessment and Plan:   Fevers in pt with  CVID on IVIG replacement-  - reviewed results of testing from 6/17/2024 ED visit at [facility] and repeated CBC, got IGG level and T cell subset as ordered. No new abnormalities noted.   - Fever free at present and exam showing no sign of infection though pt pushed that previous antibiotic- Levaquin- helped with fevers for a period of time. No new pain but abdominal pain continues as previous.   - he is fatigued but continues with his very physical work as a    - continues with IVIG home administration of IVIG weekly with azithromycin 3 x weekly with labs results showing effect with levels seen today.  Cannot see that extended time on Levaquin could be helpful and would rather treat a specific infection with appropriate antibiotic.   Pt will continue to monitor fevers and monitor for any new infection sources. Would also recommend rest and added fluids for several days to help with recovery from potential fever source but pt states he is not able to comply with request.     Set to repeat labs and see Dr Ruffin in 1 month.     The total time of this encounter amounted to  30 minutes. This time included face to face time spent with the patient, prep work, ordering tests, and performing post visit documentation.  Christa Ritter,Cnp

## 2024-06-24 ENCOUNTER — ONCOLOGY VISIT (OUTPATIENT)
Dept: ONCOLOGY | Facility: CLINIC | Age: 44
End: 2024-06-24
Attending: NURSE PRACTITIONER
Payer: COMMERCIAL

## 2024-06-24 VITALS
WEIGHT: 216 LBS | TEMPERATURE: 98.6 F | DIASTOLIC BLOOD PRESSURE: 81 MMHG | OXYGEN SATURATION: 98 % | HEART RATE: 76 BPM | RESPIRATION RATE: 18 BRPM | BODY MASS INDEX: 26.29 KG/M2 | SYSTOLIC BLOOD PRESSURE: 126 MMHG

## 2024-06-24 DIAGNOSIS — D83.9 CVID (COMMON VARIABLE IMMUNODEFICIENCY) (H): ICD-10-CM

## 2024-06-24 LAB
BASOPHILS # BLD AUTO: 0 10E3/UL (ref 0–0.2)
BASOPHILS NFR BLD AUTO: 0 %
EOSINOPHIL # BLD AUTO: 0.3 10E3/UL (ref 0–0.7)
EOSINOPHIL NFR BLD AUTO: 4 %
ERYTHROCYTE [DISTWIDTH] IN BLOOD BY AUTOMATED COUNT: 13.7 % (ref 10–15)
HCT VFR BLD AUTO: 41.7 % (ref 40–53)
HGB BLD-MCNC: 14.2 G/DL (ref 13.3–17.7)
IMM GRANULOCYTES # BLD: 0 10E3/UL
IMM GRANULOCYTES NFR BLD: 0 %
LYMPHOCYTES # BLD AUTO: 2.7 10E3/UL (ref 0.8–5.3)
LYMPHOCYTES NFR BLD AUTO: 33 %
MCH RBC QN AUTO: 29.9 PG (ref 26.5–33)
MCHC RBC AUTO-ENTMCNC: 34.1 G/DL (ref 31.5–36.5)
MCV RBC AUTO: 88 FL (ref 78–100)
MONOCYTES # BLD AUTO: 0.6 10E3/UL (ref 0–1.3)
MONOCYTES NFR BLD AUTO: 7 %
NEUTROPHILS # BLD AUTO: 4.6 10E3/UL (ref 1.6–8.3)
NEUTROPHILS NFR BLD AUTO: 56 %
NRBC # BLD AUTO: 0 10E3/UL
NRBC BLD AUTO-RTO: 0 /100
PLATELET # BLD AUTO: 158 10E3/UL (ref 150–450)
RBC # BLD AUTO: 4.75 10E6/UL (ref 4.4–5.9)
WBC # BLD AUTO: 8.3 10E3/UL (ref 4–11)

## 2024-06-24 PROCEDURE — G0463 HOSPITAL OUTPT CLINIC VISIT: HCPCS | Performed by: NURSE PRACTITIONER

## 2024-06-24 PROCEDURE — 85041 AUTOMATED RBC COUNT: CPT | Performed by: NURSE PRACTITIONER

## 2024-06-24 PROCEDURE — 86359 T CELLS TOTAL COUNT: CPT | Performed by: NURSE PRACTITIONER

## 2024-06-24 PROCEDURE — 99214 OFFICE O/P EST MOD 30 MIN: CPT | Performed by: NURSE PRACTITIONER

## 2024-06-24 PROCEDURE — 82784 ASSAY IGA/IGD/IGG/IGM EACH: CPT | Performed by: NURSE PRACTITIONER

## 2024-06-24 PROCEDURE — 36415 COLL VENOUS BLD VENIPUNCTURE: CPT | Performed by: NURSE PRACTITIONER

## 2024-06-24 ASSESSMENT — PAIN SCALES - GENERAL: PAINLEVEL: MILD PAIN (3)

## 2024-06-24 NOTE — NURSING NOTE
"Oncology Rooming Note    June 24, 2024 1:35 PM   Vinay Washington is a 44 year old male who presents for:    Chief Complaint   Patient presents with    Oncology Clinic Visit     Fever     Initial Vitals: /81 (BP Location: Right arm)   Pulse 76   Temp 98.6  F (37  C) (Oral)   Resp 18   Wt 98 kg (216 lb)   SpO2 98%   BMI 26.29 kg/m   Estimated body mass index is 26.29 kg/m  as calculated from the following:    Height as of 11/24/21: 1.93 m (6' 4\").    Weight as of this encounter: 98 kg (216 lb). Body surface area is 2.29 meters squared.  Mild Pain (3) Comment: Data Unavailable   No LMP for male patient.  Allergies reviewed: Yes  Medications reviewed: Yes    Medications: Medication refills not needed today.  Pharmacy name entered into [place]:    [facility] - [place], [place] - [address] [place], [place] - [address]    Frailty Screening:   Is the patient here for a new oncology consult visit in cancer care? 2. No      Clinical concerns: fever of  for the past 40 days. Stomach upset and pains, will dry heave in the morning.        Donna Rose LPN              "

## 2024-06-24 NOTE — LETTER
6/24/2024      Vinay Washington  [address] [address]      Dear Colleague,    Thank you for referring your patient, Vinay Washington, to the [facility]. Please see a copy of my visit note below.    Oncology Follow Up Visit: June 24, 2024    Oncologist: Dr Benjy Ruffin  PCP: Liliana Rogers    Diagnosis: CVID  Vinay Washington is a 45 yo male who was initially seen in this clinic in late 2021 for diagnosis of CVID. Has had BMBX and review by GI   Treatment:   subcutaneous IVIG dosing 24 g weekly    Interval History:  Mr Washington comes to clinic alone for review of fevers. He was seen in ED on 6/17 and a workup was completed by ED for review however no source was found and fevers are intermittent. They started 40 days ago and can go as high as 102- 104 initiallyover 3 days He was given Levaquin by ENT which initially helped but fevers then returned but not as elevated and intermittent.   He denies cold symptoms - congestion, headaches, colored drainage, sore throat or cough or ear pains or fullness at this time. Bowel and bladder are normal at this time   He still continues with abdominal pains - using pantoprozole and did start odanestron which he feels is helpful- uses Oxycontin 10 mg po daily  Takes azithromycin 3 days per week and receives IVIG at home weekly  Rest of comprehensive and complete ROS is reviewed and is negative.   Past Medical History:   Diagnosis Date     Chronic pain      CVID (common variable immunodeficiency) (H)      Current Outpatient Medications   Medication Sig Dispense Refill     ALPRAZolam (XANAX) 0.25 MG tablet Take 0.25 mg by mouth 3 times daily as needed for anxiety       azithromycin (ZITHROMAX) 250 MG tablet Take QD x 3 days/week       citalopram (CELEXA) 20 MG tablet Take 20 mg by mouth       Immune Globulin, Human, 8 GM/40ML SOLN Inject 24 g Subcutaneous once a week 320 mL 11     ondansetron (ZOFRAN) 8 MG tablet Take 8  mg by mouth       oxyCODONE (OXYCONTIN) 10 MG 12 hr tablet Take 10 mg by mouth every 12 hours       pantoprazole (PROTONIX) 40 MG EC tablet Take 40 mg by mouth       sildenafil (VIAGRA) 100 MG tablet Take 100 mg by mouth       tiZANidine (ZANAFLEX) 4 MG tablet Take 8 mg by mouth       traZODone (DESYREL) 150 MG tablet Take 300 mg by mouth       aspirin (ASA) 81 MG EC tablet Take 1 tablet (81 mg) by mouth daily (Patient not taking: Reported on 12/21/2023) 30 tablet 0     nitroGLYcerin (NITROSTAT) 0.4 MG sublingual tablet For chest pain place 1 tablet under the tongue every 5 minutes for 3 doses. If symptoms persist 5 minutes after 1st dose call 911. (Patient not taking: Reported on 2/23/2023) 3 tablet 0     No current facility-administered medications for this visit.     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    Physical Exam:/81 (BP Location: Right arm)   Pulse 76   Temp 98.6  F (37  C) (Oral)   Resp 18   Wt 98 kg (216 lb)   SpO2 98%   BMI 26.29 kg/m     ECOG PS- 0  Constitutional: Alert, cooperative and in no distress. Obviously frustrated by recurrent fevers.   ENT: Eyes bright , No mouth sores, throat redness. Ears without redness or change in TMs.   Neck: Supple, No adenopathy.  Cardiac: Heart rate and rhythm is regular and strong without murmur  Respiratory: Breathing easy. Lung sounds clear to auscultation- no cough  GI: Abdomen is soft, non-tender, BS normal. No masses or organomegaly  MS: Muscle tone normal, extremities normal with no edema.   Skin: No suspicious lesions or rashes- no open sores   Neuro: Sensory grossly WNL, gait normal.   Lymph: Normal ant/post cervical, axillary, supraclavicular nodes  Psych: Mentation appears normal and affect  normal/bright and smiling    Laboratory/Imaging Results:   Results for orders placed or performed in visit on 06/24/24   IgG     Status: Abnormal   Result Value Ref Range    Immunoglobulin G 1,617 (H) 610 - 1,616 mg/dL   T cell subset profile     Status: Abnormal   Result Value Ref Range    CD3% Total T Cells 85 (H) 49 - 84 %    Absolute CD3, Total T Cells 2,359 603 - 2,990 cells/uL    CD4% Sterrett T Cells 62 28 - 63 %    Absolute CD4, Sterrett T Cells 1,715 441 - 2,156 cells/uL    CD8% Suppressor T Cells 30 10 - 40 %    Absolute CD8, Suppressor T Cells 824 125 - 1,312 cells/uL    CD4:CD8 Ratio 2.08 1.40 - 2.60    T Cell Comment     CBC with platelets and differential     Status: None   Result Value Ref Range    WBC Count 8.3 4.0 - 11.0 10e3/uL    RBC Count 4.75 4.40 - 5.90 10e6/uL    Hemoglobin 14.2 13.3 - 17.7 g/dL    Hematocrit 41.7 40.0 - 53.0 %    MCV 88 78 - 100 fL    MCH 29.9 26.5 - 33.0 pg    MCHC 34.1 31.5 - 36.5 g/dL    RDW 13.7 10.0 - 15.0 %    Platelet Count 158 150 - 450 10e3/uL    % Neutrophils 56 %    % Lymphocytes 33 %    % Monocytes 7 %    % Eosinophils 4 %    % Basophils 0 %    % Immature Granulocytes 0 %    NRBCs per 100 WBC 0 <1 /100    Absolute Neutrophils 4.6 1.6 - 8.3 10e3/uL    Absolute Lymphocytes 2.7 0.8 - 5.3 10e3/uL    Absolute Monocytes 0.6 0.0 - 1.3 10e3/uL    Absolute Eosinophils 0.3 0.0 - 0.7 10e3/uL    Absolute Basophils 0.0 0.0 - 0.2 10e3/uL    Absolute Immature Granulocytes 0.0 <=0.4 10e3/uL    Absolute NRBCs 0.0 10e3/uL   CBC with Platelets & Differential     Status: None    Narrative    The following orders were created for panel order CBC with Platelets & Differential.  Procedure                               Abnormality         Status                     ---------                               -----------         ------                     CBC with platelets and d...[[id number]]                      Final result                 Please view results for these tests on the individual  orders.   Reviewed results from ED visit on 6/17 including cbc, cmp C reactive protein, ESR, anaplasma Phagocytophilum AB IGG, Ehrlichia AB profile, TIck borne disease Profile IGG, Babesia microti ABS IGG, and Ehlischia Chaffeensis.       Assessment and Plan:   Fevers in pt with CVID on IVIG replacement-  - reviewed results of testing from 6/17/2024 ED visit at [facility] and repeated CBC, got IGG level and T cell subset as ordered. No new abnormalities noted.   - Fever free at present and exam showing no sign of infection though pt pushed that previous antibiotic- Levaquin- helped with fevers for a period of time. No new pain but abdominal pain continues as previous.   - he is fatigued but continues with his very physical work as a    - continues with IVIG home administration of IVIG weekly with azithromycin 3 x weekly with labs results showing effect with levels seen today.  Cannot see that extended time on Levaquin could be helpful and would rather treat a specific infection with appropriate antibiotic.   Pt will continue to monitor fevers and monitor for any new infection sources. Would also recommend rest and added fluids for several days to help with recovery from potential fever source but pt states he is not able to comply with request.     Set to repeat labs and see Dr Ruffin in 1 month.     The total time of this encounter amounted to  30 minutes. This time included face to face time spent with the patient, prep work, ordering tests, and performing post visit documentation.  Christa Ritter Cnp      Again, thank you for allowing me to participate in the care of your patient.        Sincerely,        Christa Ritter, ERIC, APRN CNP

## 2024-06-25 LAB
CD3 CELLS # BLD: 2359 CELLS/UL (ref 603–2990)
CD3 CELLS NFR BLD: 85 % (ref 49–84)
CD3+CD4+ CELLS # BLD: 1715 CELLS/UL (ref 441–2156)
CD3+CD4+ CELLS NFR BLD: 62 % (ref 28–63)
CD3+CD4+ CELLS/CD3+CD8+ CLL BLD: 2.08 % (ref 1.4–2.6)
CD3+CD8+ CELLS # BLD: 824 CELLS/UL (ref 125–1312)
CD3+CD8+ CELLS NFR BLD: 30 % (ref 10–40)
IGG SERPL-MCNC: 1617 MG/DL (ref 610–1616)
T CELL COMMENT: ABNORMAL

## 2024-07-27 ENCOUNTER — HEALTH MAINTENANCE LETTER (OUTPATIENT)
Age: 44
End: 2024-07-27

## 2024-10-31 ENCOUNTER — TELEPHONE (OUTPATIENT)
Dept: ONCOLOGY | Facility: CLINIC | Age: 44
End: 2024-10-31
Payer: COMMERCIAL

## 2024-10-31 DIAGNOSIS — D83.9 CVID (COMMON VARIABLE IMMUNODEFICIENCY) (H): Primary | ICD-10-CM

## 2024-11-05 ENCOUNTER — TELEPHONE (OUTPATIENT)
Dept: ONCOLOGY | Facility: CLINIC | Age: 44
End: 2024-11-05
Payer: COMMERCIAL

## 2024-11-05 NOTE — TELEPHONE ENCOUNTER
"Just an FYI in case he needs to be contacted to R/S his appt with Dr. Ruffin on 11/14/24 if results won't be ready.     I called patient and discussed that he needed labs done a week prior to his virtual appt on 11/14/24 he said the soonest he was available would be 11/11/24. I made him that lab appt in [facility] lab and told him there would maybe be a chance that his results wouldn't be ready by his appt time and he said... \"Whatever, I'm not worried about it\".    Ofelia Crawford MA on 11/5/2024 at 2:21 PM     "

## 2024-11-07 NOTE — TELEPHONE ENCOUNTER
[facility]/Voicemail    Clinical Data: Care Coordinator Outreach    Outreach attempted x 1.  Left message on patient's voicemail with call back information and requested return call.    REASON: Reminder for patient to complete labs locally in next couple days.    Plan: Care Coordinator will try to reach patient again in 1-2 business days.    Sunita Elder RNCC

## 2024-11-11 ENCOUNTER — LAB (OUTPATIENT)
Dept: LAB | Facility: CLINIC | Age: 44
End: 2024-11-11
Payer: COMMERCIAL

## 2024-11-11 DIAGNOSIS — D83.9 CVID (COMMON VARIABLE IMMUNODEFICIENCY) (H): ICD-10-CM

## 2024-11-11 LAB
BASOPHILS # BLD AUTO: 0 10E3/UL (ref 0–0.2)
BASOPHILS NFR BLD AUTO: 0 %
EOSINOPHIL # BLD AUTO: 0.2 10E3/UL (ref 0–0.7)
EOSINOPHIL NFR BLD AUTO: 3 %
ERYTHROCYTE [DISTWIDTH] IN BLOOD BY AUTOMATED COUNT: 13.1 % (ref 10–15)
HCT VFR BLD AUTO: 38.5 % (ref 40–53)
HGB BLD-MCNC: 13.5 G/DL (ref 13.3–17.7)
IMM GRANULOCYTES # BLD: 0 10E3/UL
IMM GRANULOCYTES NFR BLD: 0 %
LYMPHOCYTES # BLD AUTO: 2.7 10E3/UL (ref 0.8–5.3)
LYMPHOCYTES NFR BLD AUTO: 34 %
MCH RBC QN AUTO: 29.9 PG (ref 26.5–33)
MCHC RBC AUTO-ENTMCNC: 35.1 G/DL (ref 31.5–36.5)
MCV RBC AUTO: 85 FL (ref 78–100)
MONOCYTES # BLD AUTO: 0.7 10E3/UL (ref 0–1.3)
MONOCYTES NFR BLD AUTO: 9 %
NEUTROPHILS # BLD AUTO: 4.2 10E3/UL (ref 1.6–8.3)
NEUTROPHILS NFR BLD AUTO: 54 %
NRBC # BLD AUTO: 0 10E3/UL
NRBC BLD AUTO-RTO: 0 /100
PLATELET # BLD AUTO: 143 10E3/UL (ref 150–450)
RBC # BLD AUTO: 4.51 10E6/UL (ref 4.4–5.9)
WBC # BLD AUTO: 7.7 10E3/UL (ref 4–11)

## 2024-11-11 PROCEDURE — 36415 COLL VENOUS BLD VENIPUNCTURE: CPT

## 2024-11-11 PROCEDURE — 86360 T CELL ABSOLUTE COUNT/RATIO: CPT

## 2024-11-11 PROCEDURE — 86359 T CELLS TOTAL COUNT: CPT

## 2024-11-11 PROCEDURE — 85025 COMPLETE CBC W/AUTO DIFF WBC: CPT

## 2024-11-11 PROCEDURE — 82784 ASSAY IGA/IGD/IGG/IGM EACH: CPT

## 2024-11-12 LAB
CD3 CELLS # BLD: 2297 CELLS/UL (ref 603–2990)
CD3 CELLS NFR BLD: 87 % (ref 49–84)
CD3+CD4+ CELLS # BLD: 1676 CELLS/UL (ref 441–2156)
CD3+CD4+ CELLS NFR BLD: 64 % (ref 28–63)
CD3+CD4+ CELLS/CD3+CD8+ CLL BLD: 2.01 % (ref 1.4–2.6)
CD3+CD8+ CELLS # BLD: 832 CELLS/UL (ref 125–1312)
CD3+CD8+ CELLS NFR BLD: 32 % (ref 10–40)
IGG SERPL-MCNC: 1517 MG/DL (ref 610–1616)
T CELL COMMENT: ABNORMAL

## 2024-11-14 ENCOUNTER — VIRTUAL VISIT (OUTPATIENT)
Dept: ONCOLOGY | Facility: CLINIC | Age: 44
End: 2024-11-14
Attending: INTERNAL MEDICINE
Payer: COMMERCIAL

## 2024-11-14 DIAGNOSIS — R50.81 FEVER IN OTHER DISEASES: ICD-10-CM

## 2024-11-14 DIAGNOSIS — D83.9 CVID (COMMON VARIABLE IMMUNODEFICIENCY) (H): Primary | ICD-10-CM

## 2024-11-14 DIAGNOSIS — R10.11 ABDOMINAL PAIN, RIGHT UPPER QUADRANT: ICD-10-CM

## 2024-11-14 NOTE — Clinical Note
2024      Vinay Washington  [address] [address]      Dear Colleague,    Thank you for referring your patient, Vinay Washington, to the [facility] [place]. Please see a copy of my visit note below.    [facility] Hematology / Oncology  Progress Note  Name: Vinay Washington  :  1980  MRN:  [id number]    --------------------    Subjective:  ***.    --------------------    Objective:  VS: There were no vitals taken for this visit.  Gen: ***-appearing.    We reviewed ***.  We reviewed ***.    --------------------    Assessment / Plan:  YONG Ruffin MD                Again, thank you for allowing me to participate in the care of your patient.        Sincerely,        Benjy Ruffin MD

## 2024-11-14 NOTE — PATIENT INSTRUCTIONS
1) Sunita, please page Liliana Rogers and Shun Copeland ([employer]) to my cell phone.  2) CT CAP.  3) JOLIE OH 3 months w/ labs (CBC, IgG).    Benjy Ruffin MD.

## 2024-11-14 NOTE — PROGRESS NOTES
[facility] Hematology / Oncology  Progress Note  Name: Vinay Washington  :  1980  MRN:  [id number]    --------------------    Subjective:  Klaus returns for follow-up of CVID unaccompanied.  All in all, he feels fairly well.  He continues to deal with some recurrent infections here and they are requiring antibiotic courses.  He has been evaluated by the allergist and found out that he has a ton of environmental allergens.  He continues to have a lot of abdominal issues.  This occurs despite dietary modifications to avoid any greasy foods or challenging foods to digest.  He successfully went through an EGD which described a normal esophagus, stomach and duodenum.  It sounds like he had a HIDA scan that showed an overactive gallbladder although he did have a little juice prior to the test.  The IVIG subcu infusions have been going incredibly well.  No issues with coverage or financial toxicity.  He has been trying to switch at the sites to avoid scar tissue associated with administration.  He also describes daily fevers that often resolve with antibiotics.    --------------------    Objective:  Phone visit.    We reviewed CBC, IgG, CD4.  We reviewed HIDA.    --------------------    Assessment / Plan:  CVID  Ongoing gastrointestinal issues.  Thrombocytopenia, mild.  Fevers.    Regarding CVID, Klaus and I agreed to continue subcu IVIG.  His IgG level looks excellent at 1500.  Previously we backed dosing and he had more symptoms when his levels were lower.  Regarding abdominal complaints, I will reach out to his primary care provider and prior surgeon to discuss cholecystectomy as other evaluation has been reassuring.  Regarding fevers, we plan to get a CT of his chest abdomen and pelvis to ensure no chronic or smoldering infections, lymphoproliferative disorders; may relate to gallbladder.  Regarding thrombocytopenia, this is mild and intermittent and bears monitoring.    Benjy Ruffin,  MD    Video Visit:  Vinay is a 44 year old male who is being evaluated via a billable video visit.  }    Video start time:  8:09 AM  Video end time:  8:39 AM  Provider location: [facility]  Patient location: Home  Mode of transmission:  MTPV / XTRM.

## 2024-11-14 NOTE — Clinical Note
2024      Vinay Washington  [address] [address]      Dear Colleague,    Thank you for referring your patient, Vinay Washington, to the [facility] [place]. Please see a copy of my visit note below.    [facility] Hematology / Oncology  Progress Note  Name: Vinay Washington  :  1980  MRN:  [id number]    --------------------    Subjective:  ***.    --------------------    Objective:  VS: There were no vitals taken for this visit.  Gen: ***-appearing.    We reviewed ***.  We reviewed ***.    --------------------    Assessment / Plan:  YONG Ruffin MD                Again, thank you for allowing me to participate in the care of your patient.        Sincerely,        Benjy Ruffin MD

## 2024-11-15 ENCOUNTER — TELEPHONE (OUTPATIENT)
Dept: ONCOLOGY | Facility: CLINIC | Age: 44
End: 2024-11-15
Payer: COMMERCIAL

## 2024-11-15 NOTE — TELEPHONE ENCOUNTER
Writer spoke with patient's primary care clinic regarding below. Referral will be placed by them for gen surg through [facility], if patient is unable to be seen there for some reason we can offer a consult with Dr. Diane or Dr. Sandy here in [place].       Benjy Ruffin MD Nichols, Lindsay N, RN Lindsay,    Would you please contact Klaus' primary care provider Liliana Rogers (okay to leave message)?  Klaus needs to see a surgeon within their system.  I spoke with Dr. Copeland who does not do [facility]. Otherwise, he could see Dr. Sandy or Benedicto for consideration of cholecystectomy.    Benjy Ruffin MD.

## 2024-11-15 NOTE — TELEPHONE ENCOUNTER
Fatoumata JUNIOR from [facility] calling requesting clarification on referral that was sent to them for general surgery- state that pt has already seen Dr Copeland.

## 2024-11-26 ENCOUNTER — TRANSCRIBE ORDERS (OUTPATIENT)
Dept: OTHER | Age: 44
End: 2024-11-26

## 2024-11-26 DIAGNOSIS — K82.8 BILIARY DYSKINESIA: Primary | ICD-10-CM

## 2024-12-04 ENCOUNTER — MEDICAL CORRESPONDENCE (OUTPATIENT)
Dept: HEALTH INFORMATION MANAGEMENT | Facility: CLINIC | Age: 44
End: 2024-12-04
Payer: COMMERCIAL

## 2024-12-04 ENCOUNTER — TRANSFERRED RECORDS (OUTPATIENT)
Dept: HEALTH INFORMATION MANAGEMENT | Facility: CLINIC | Age: 44
End: 2024-12-04
Payer: COMMERCIAL

## 2024-12-04 ENCOUNTER — TELEPHONE (OUTPATIENT)
Dept: ONCOLOGY | Facility: CLINIC | Age: 44
End: 2024-12-04
Payer: COMMERCIAL

## 2024-12-04 DIAGNOSIS — D83.9 CVID (COMMON VARIABLE IMMUNODEFICIENCY) (H): ICD-10-CM

## 2024-12-04 NOTE — TELEPHONE ENCOUNTER
[facility]: [facility]                                                                                          Office visit note: Regarding CVID, Continue subcu IVIG. His IgG level looks excellent at 1500. Previously we backed dosing and he had more symptoms when his levels were lower     Faxed requested orders to Anita Wood: [phone]; copy in RN drawer.  Immune Globulin, Human, (CUVITRU) 8 GM/40ML (20%) injection 320 mL 11 12/4/2024 -- No   Sig - Route: Inject 120 mLs (24 g) subcutaneously once a week. - Subcutaneous     Signature:  Sunita Elder, RN

## 2024-12-05 ENCOUNTER — PATIENT OUTREACH (OUTPATIENT)
Dept: ONCOLOGY | Facility: CLINIC | Age: 44
End: 2024-12-05
Payer: COMMERCIAL

## 2024-12-05 NOTE — PROGRESS NOTES
Call placed to [facility] requesting CT chest w/contrast and CT AP w/contrast dated 12/4/2024 be pushed to [facility] PACS.  Outside Imaging Archiving Request Form completed and provided to radiology department.    Liliana Dominguez RN

## 2025-03-01 ENCOUNTER — DOCUMENTATION ONLY (OUTPATIENT)
Dept: ONCOLOGY | Facility: CLINIC | Age: 45
End: 2025-03-01
Payer: COMMERCIAL

## 2025-03-01 NOTE — PROGRESS NOTES
"Vinay Washington has an upcoming lab appointment:    Future Appointments   Date Time Provider Department Center   3/12/2025  8:30 AM PH LAB [facility]   3/12/2025  9:00 AM Suma Rogers APRN CNP [facility]     Patient is scheduled for the following lab(s): This patient has an upcoming lab appointment. The appointment notes state \"(CBC, IgG.) Dr. Ruffin labs for Suma Rogers JOLIE OV.\" There are currently no orders in the chart. Please place orders, if needed.     There is no order available. Please review and place either future orders or HMPO (Review of Health Maintenance Protocol Orders), as appropriate.    Health Maintenance Due   Topic    HEPATITIS C SCREENING     LIPID      Federica Dutton    "

## 2025-03-11 DIAGNOSIS — D83.9 CVID (COMMON VARIABLE IMMUNODEFICIENCY) (H): Primary | ICD-10-CM

## 2025-03-19 ENCOUNTER — LAB (OUTPATIENT)
Dept: LAB | Facility: CLINIC | Age: 45
End: 2025-03-19
Attending: INTERNAL MEDICINE
Payer: COMMERCIAL

## 2025-03-19 ENCOUNTER — ONCOLOGY VISIT (OUTPATIENT)
Dept: ONCOLOGY | Facility: CLINIC | Age: 45
End: 2025-03-19
Attending: INTERNAL MEDICINE
Payer: COMMERCIAL

## 2025-03-19 VITALS
RESPIRATION RATE: 12 BRPM | BODY MASS INDEX: 26.79 KG/M2 | WEIGHT: 220 LBS | DIASTOLIC BLOOD PRESSURE: 92 MMHG | SYSTOLIC BLOOD PRESSURE: 160 MMHG | HEIGHT: 76 IN | OXYGEN SATURATION: 97 % | TEMPERATURE: 99.7 F | HEART RATE: 101 BPM

## 2025-03-19 DIAGNOSIS — R50.81 FEVER IN OTHER DISEASES: ICD-10-CM

## 2025-03-19 DIAGNOSIS — D83.9 CVID (COMMON VARIABLE IMMUNODEFICIENCY) (H): ICD-10-CM

## 2025-03-19 DIAGNOSIS — J01.11 ACUTE RECURRENT FRONTAL SINUSITIS: ICD-10-CM

## 2025-03-19 DIAGNOSIS — D83.9 CVID (COMMON VARIABLE IMMUNODEFICIENCY) (H): Primary | ICD-10-CM

## 2025-03-19 LAB
BASOPHILS # BLD AUTO: 0 10E3/UL (ref 0–0.2)
BASOPHILS NFR BLD AUTO: 0 %
EOSINOPHIL # BLD AUTO: 0.1 10E3/UL (ref 0–0.7)
EOSINOPHIL NFR BLD AUTO: 1 %
ERYTHROCYTE [DISTWIDTH] IN BLOOD BY AUTOMATED COUNT: 13.5 % (ref 10–15)
HCT VFR BLD AUTO: 41.7 % (ref 40–53)
HGB BLD-MCNC: 14.8 G/DL (ref 13.3–17.7)
IMM GRANULOCYTES # BLD: 0 10E3/UL
IMM GRANULOCYTES NFR BLD: 0 %
LYMPHOCYTES # BLD AUTO: 2.6 10E3/UL (ref 0.8–5.3)
LYMPHOCYTES NFR BLD AUTO: 34 %
MCH RBC QN AUTO: 30.7 PG (ref 26.5–33)
MCHC RBC AUTO-ENTMCNC: 35.5 G/DL (ref 31.5–36.5)
MCV RBC AUTO: 87 FL (ref 78–100)
MONOCYTES # BLD AUTO: 0.4 10E3/UL (ref 0–1.3)
MONOCYTES NFR BLD AUTO: 5 %
NEUTROPHILS # BLD AUTO: 4.5 10E3/UL (ref 1.6–8.3)
NEUTROPHILS NFR BLD AUTO: 59 %
NRBC # BLD AUTO: 0 10E3/UL
NRBC BLD AUTO-RTO: 0 /100
PLATELET # BLD AUTO: 128 10E3/UL (ref 150–450)
RBC # BLD AUTO: 4.82 10E6/UL (ref 4.4–5.9)
WBC # BLD AUTO: 7.6 10E3/UL (ref 4–11)

## 2025-03-19 PROCEDURE — 36415 COLL VENOUS BLD VENIPUNCTURE: CPT

## 2025-03-19 PROCEDURE — 99214 OFFICE O/P EST MOD 30 MIN: CPT

## 2025-03-19 RX ORDER — ONDANSETRON 8 MG/1
8 TABLET, FILM COATED ORAL
COMMUNITY
Start: 2025-02-03 | End: 2026-02-03

## 2025-03-19 RX ORDER — PANTOPRAZOLE SODIUM 40 MG/1
TABLET, DELAYED RELEASE ORAL
COMMUNITY
Start: 2025-03-09

## 2025-03-19 ASSESSMENT — PAIN SCALES - GENERAL: PAINLEVEL_OUTOF10: MODERATE PAIN (4)

## 2025-03-19 NOTE — Clinical Note
"3/19/2025      Vinay Washington  [address] [address]      Dear Colleague,    Thank you for referring your patient, Vinay Washington, to the [facility]. Please see a copy of my visit note below.    Oncology Follow-Up Visit: March 19, 2025    Oncologist: Dr. Ruffin   PCP: Liliana Rogers     Reason for Visit: Follow-up CVID      Diagnosis: CVID on gammaglobulin replacement   Vinay Washington is a 43 yo male who was initially seen in this clinic in late 2021 for diagnosis of CVID. Has had BMBX and review by GI and rheumatology.    Treatment:  Subcutaneous IVIG dosing 24 g weekly     Interval History:  Pt is seen in-person for review of ***    Patient denies any of the following except if noted above: fevers, chills, difficulty with energy, vision or hearing changes, chest pain, dyspnea, abdominal pain, nausea, vomiting, diarrhea, constipation, urinary concerns, headaches, numbness, tingling, issues with sleep or mood. He/She also denies lumps, bumps, rashes or skin lesions, bleeding or bruising issues.    Physical Exam:  BP (!) 160/92 (BP Location: Right arm, Patient Position: Sitting, Cuff Size: Adult Regular)   Pulse 101   Temp 99.7  F (37.6  C) (Temporal)   Resp 12   Ht 1.93 m (6' 4\")   Wt 99.8 kg (220 lb)   SpO2 97%   BMI 26.78 kg/m       BP Readings from Last 6 Encounters:   03/19/25 (!) 160/92   06/24/24 126/81   12/21/23 (!) 150/86   12/16/22 131/88   11/08/22 (!) 165/89   11/24/21 131/88     Wt Readings from Last 6 Encounters:   03/19/25 99.8 kg (220 lb)   06/24/24 98 kg (216 lb)   12/21/23 99 kg (218 lb 3 oz)   12/16/22 96.2 kg (212 lb)   11/08/22 95.6 kg (210 lb 11.2 oz)   11/24/21 92.5 kg (204 lb)      Constitutional: No acute distress, pleasant, appropriately groomed.   ENT: PERRLA, sclera without erythema. Appropriate dentition.  Neck: Trachea midline, no adenopathy.   Resp: CTA, adequate depth and rate of respirations.   Cardiac: " S1/S2, RRR, no murmurs.   Abdomen: BS active, abdomen soft and non-tender. No masses or organomegaly.   MS:  5/5 muscle strength, adequate ROM.   Skin: No rashes, lesions, or wounds on exposed skin.  Neuro: A/O x 4, sensation intact.   Lymph: No palpable anterior/posterior cervical, axillary, or supraclavicular nodes.   Psych: Appropriate mentation and affect.    Laboratory Results:   Results for orders placed or performed in visit on 03/19/25   CBC with platelets and differential     Status: Abnormal   Result Value Ref Range    WBC Count 7.6 4.0 - 11.0 10e3/uL    RBC Count 4.82 4.40 - 5.90 10e6/uL    Hemoglobin 14.8 13.3 - 17.7 g/dL    Hematocrit 41.7 40.0 - 53.0 %    MCV 87 78 - 100 fL    MCH 30.7 26.5 - 33.0 pg    MCHC 35.5 31.5 - 36.5 g/dL    RDW 13.5 10.0 - 15.0 %    Platelet Count 128 (L) 150 - 450 10e3/uL    % Neutrophils 59 %    % Lymphocytes 34 %    % Monocytes 5 %    % Eosinophils 1 %    % Basophils 0 %    % Immature Granulocytes 0 %    NRBCs per 100 WBC 0 <1 /100    Absolute Neutrophils 4.5 1.6 - 8.3 10e3/uL    Absolute Lymphocytes 2.6 0.8 - 5.3 10e3/uL    Absolute Monocytes 0.4 0.0 - 1.3 10e3/uL    Absolute Eosinophils 0.1 0.0 - 0.7 10e3/uL    Absolute Basophils 0.0 0.0 - 0.2 10e3/uL    Absolute Immature Granulocytes 0.0 <=0.4 10e3/uL    Absolute NRBCs 0.0 10e3/uL   CBC with Platelets & Differential     Status: Abnormal    Narrative    The following orders were created for panel order CBC with Platelets & Differential.  Procedure                               Abnormality         Status                     ---------                               -----------         ------                     CBC with platelets and ...[[id number]]  Abnormal            Final result               RBC and Platelet Morpho...[[id number]]                                                   Please view results for these tests on the individual orders.   I reviewed the above labs today.      Assessment and Plan:   CVID on  "gammaglobulin replacement   - No recurrent infections  - Currently on subcutaneous IVIG weekly  - Tolerating well without mentionable side effects  - Labs and exam reviewed  - Continue current plan without changes  - RTC in 6 months for review with Dr. Ruffin, labs completed prior (CBC, IGG, T-cell subset)            Suma JUSTIN CNP    Oncology Follow-Up Visit: March 19, 2025    Oncologist: Dr. Ruffin   PCP: Liliana Rogers     Reason for Visit: Follow-up CVID      Diagnosis: CVID on gammaglobulin replacement   Vinay Washington is a 45 yo male who was initially seen in this clinic in late 2021 for diagnosis of CVID. Has had BMBX and review by GI and rheumatology.    Treatment:  Subcutaneous IVIG dosing 24 g weekly     Interval History:  Pt is seen in-person for review of CVID. Mentions he having a hard time falling asleep. Has maybe slept 3-5 hours/night the past week. Also mentions new headaches. Feels as though he is getting a sinus infection. His snot is green/brown. Having fevers. Would like to try a penicillin abx as he does not believe he has an allergy.     Patient denies any of the following except if noted above: fevers, chills, difficulty with energy, vision or hearing changes, chest pain, dyspnea, abdominal pain, nausea, vomiting, diarrhea, constipation, urinary concerns, headaches, numbness, tingling, issues with sleep or mood. He also denies lumps, bumps, rashes or skin lesions, bleeding or bruising issues.    Physical Exam:  BP (!) 160/92 (BP Location: Right arm, Patient Position: Sitting, Cuff Size: Adult Regular)   Pulse 101   Temp 99.7  F (37.6  C) (Temporal)   Resp 12   Ht 1.93 m (6' 4\")   Wt 99.8 kg (220 lb)   SpO2 97%   BMI 26.78 kg/m       BP Readings from Last 6 Encounters:   03/19/25 (!) 160/92   06/24/24 126/81   12/21/23 (!) 150/86   12/16/22 131/88   11/08/22 (!) 165/89   11/24/21 131/88     Wt Readings from Last 6 Encounters:   03/19/25 99.8 kg (220 lb)   06/24/24 " 98 kg (216 lb)   12/21/23 99 kg (218 lb 3 oz)   12/16/22 96.2 kg (212 lb)   11/08/22 95.6 kg (210 lb 11.2 oz)   11/24/21 92.5 kg (204 lb)      Constitutional: No acute distress, pleasant, appropriately groomed.   ENT: PERRLA, sclera without erythema. Patent nasal passages.   Neck: Trachea midline, no adenopathy.   Resp: No respiratory distress, adequate depth and rate of respirations.   Cardiac: No cyanosis, JVD, or peripheral edema.  MS:  5/5 muscle strength, adequate ROM.   Skin: No rashes, lesions, or wounds.  Neuro: A/O x 4, sensation intact.   Psych: Appropriate mentation and affect.     Laboratory Results:   Results for orders placed or performed in visit on 03/19/25   CBC with platelets and differential     Status: Abnormal   Result Value Ref Range    WBC Count 7.6 4.0 - 11.0 10e3/uL    RBC Count 4.82 4.40 - 5.90 10e6/uL    Hemoglobin 14.8 13.3 - 17.7 g/dL    Hematocrit 41.7 40.0 - 53.0 %    MCV 87 78 - 100 fL    MCH 30.7 26.5 - 33.0 pg    MCHC 35.5 31.5 - 36.5 g/dL    RDW 13.5 10.0 - 15.0 %    Platelet Count 128 (L) 150 - 450 10e3/uL    % Neutrophils 59 %    % Lymphocytes 34 %    % Monocytes 5 %    % Eosinophils 1 %    % Basophils 0 %    % Immature Granulocytes 0 %    NRBCs per 100 WBC 0 <1 /100    Absolute Neutrophils 4.5 1.6 - 8.3 10e3/uL    Absolute Lymphocytes 2.6 0.8 - 5.3 10e3/uL    Absolute Monocytes 0.4 0.0 - 1.3 10e3/uL    Absolute Eosinophils 0.1 0.0 - 0.7 10e3/uL    Absolute Basophils 0.0 0.0 - 0.2 10e3/uL    Absolute Immature Granulocytes 0.0 <=0.4 10e3/uL    Absolute NRBCs 0.0 10e3/uL   CBC with Platelets & Differential     Status: Abnormal    Narrative    The following orders were created for panel order CBC with Platelets & Differential.  Procedure                               Abnormality         Status                     ---------                               -----------         ------                     CBC with platelets and ...[[id number]]  Abnormal            Final result                RBC and Platelet Morpho...[[id number]]                                                   Please view results for these tests on the individual orders.   I reviewed the above labs today.      Assessment and Plan:   CVID on gammaglobulin replacement   - No recurrent infections  - Currently on subcutaneous IVIG weekly  - Tolerating well without mentionable side effects  - Labs and exam reviewed  - Continue current plan without changes  - RTC in 6 months for review with Dr. Ruffin, labs completed prior (CBC, IGG, T-cell subset)            Suma JUSTIN, CNP      Again, thank you for allowing me to participate in the care of your patient.        Sincerely,        NHAN Ding CNP    Electronically signed

## 2025-03-19 NOTE — PROGRESS NOTES
"Oncology Follow-Up Visit: March 19, 2025    Oncologist: Dr. Ruffin   PCP: Liliana Rogers     Reason for Visit: Follow-up CVID      Diagnosis: CVID on gammaglobulin replacement   Vinay Washington is a 45 yo male who was initially seen in this clinic in late 2021 for diagnosis of CVID. Has had BMBX and review by GI and rheumatology.    Treatment:  Subcutaneous IVIG dosing 24 g weekly     Interval History:  Pt is seen in-person for review of CVID. Mentions he having a hard time falling asleep. Has maybe slept 3-5 hours/night the past week. Also mentions new headaches. Feels as though he is getting a sinus infection. His snot is green/brown. Having fevers. Would like to try a penicillin abx as he does not believe he has an allergy. Tolerating subcutaneous injections without concern. No additional questions.      Patient denies any of the following except if noted above: chills, difficulty with energy, vision or hearing changes, chest pain, dyspnea, abdominal pain, nausea, vomiting, diarrhea, constipation, urinary concerns, headaches, numbness, tingling, issues with sleep or mood. He also denies lumps, bumps, rashes or skin lesions, bleeding or bruising issues.    Physical Exam:  BP (!) 160/92 (BP Location: Right arm, Patient Position: Sitting, Cuff Size: Adult Regular)   Pulse 101   Temp 99.7  F (37.6  C) (Temporal)   Resp 12   Ht 1.93 m (6' 4\")   Wt 99.8 kg (220 lb)   SpO2 97%   BMI 26.78 kg/m       BP Readings from Last 6 Encounters:   03/19/25 (!) 160/92   06/24/24 126/81   12/21/23 (!) 150/86   12/16/22 131/88   11/08/22 (!) 165/89   11/24/21 131/88     Wt Readings from Last 6 Encounters:   03/19/25 99.8 kg (220 lb)   06/24/24 98 kg (216 lb)   12/21/23 99 kg (218 lb 3 oz)   12/16/22 96.2 kg (212 lb)   11/08/22 95.6 kg (210 lb 11.2 oz)   11/24/21 92.5 kg (204 lb)      Constitutional: No acute distress, pleasant, appropriately groomed.   ENT: PERRLA, sclera without erythema. Patent nasal passages. "   Neck: Trachea midline, no adenopathy.   Resp: No respiratory distress, adequate depth and rate of respirations.   Cardiac: No cyanosis, JVD, or peripheral edema.  MS:  5/5 muscle strength, adequate ROM.   Skin: No rashes, lesions, or wounds.  Neuro: A/O x 4, sensation intact.   Psych: Appropriate mentation and affect.     Laboratory Results:   Results for orders placed or performed in visit on 03/19/25   CBC with platelets and differential     Status: Abnormal   Result Value Ref Range    WBC Count 7.6 4.0 - 11.0 10e3/uL    RBC Count 4.82 4.40 - 5.90 10e6/uL    Hemoglobin 14.8 13.3 - 17.7 g/dL    Hematocrit 41.7 40.0 - 53.0 %    MCV 87 78 - 100 fL    MCH 30.7 26.5 - 33.0 pg    MCHC 35.5 31.5 - 36.5 g/dL    RDW 13.5 10.0 - 15.0 %    Platelet Count 128 (L) 150 - 450 10e3/uL    % Neutrophils 59 %    % Lymphocytes 34 %    % Monocytes 5 %    % Eosinophils 1 %    % Basophils 0 %    % Immature Granulocytes 0 %    NRBCs per 100 WBC 0 <1 /100    Absolute Neutrophils 4.5 1.6 - 8.3 10e3/uL    Absolute Lymphocytes 2.6 0.8 - 5.3 10e3/uL    Absolute Monocytes 0.4 0.0 - 1.3 10e3/uL    Absolute Eosinophils 0.1 0.0 - 0.7 10e3/uL    Absolute Basophils 0.0 0.0 - 0.2 10e3/uL    Absolute Immature Granulocytes 0.0 <=0.4 10e3/uL    Absolute NRBCs 0.0 10e3/uL   CBC with Platelets & Differential     Status: Abnormal    Narrative    The following orders were created for panel order CBC with Platelets & Differential.  Procedure                               Abnormality         Status                     ---------                               -----------         ------                     CBC with platelets and ...[[id number]]  Abnormal            Final result               RBC and Platelet Morpho...[[id number]]                                                   Please view results for these tests on the individual orders.   I reviewed the above labs today.      Assessment and Plan:   CVID on gammaglobulin replacement   - Possible sinusitis.  Will trial amoxicillin-clavulanate, listed as allergy. Advised patient to have benadryl on hand.  - Currently on subcutaneous IVIG weekly and tolerating well.  - Labs in process and will follow-up regarding results.  - Continue current plan without changes.  - Reviewed headaches could also be caused by zofran and elevated blood pressure.   - Reviewed healthy sleep hygiene. Pt will trial OTC melatonin. Follow-up with PCP if no improvement.  - RTC in 6 months for review with Dr. Ruffin, labs completed prior (CBC, IGG, T-cell subset)            Suma JUSTIN, CNP

## 2025-03-20 LAB
CD19 CELLS # BLD: 287 CELLS/UL (ref 107–698)
CD19 CELLS NFR BLD: 10 % (ref 6–27)
CD3 CELLS # BLD: 2305 CELLS/UL (ref 603–2990)
CD3 CELLS NFR BLD: 82 % (ref 49–84)
CD3+CD4+ CELLS # BLD: 1666 CELLS/UL (ref 441–2156)
CD3+CD4+ CELLS NFR BLD: 60 % (ref 28–63)
CD3+CD4+ CELLS/CD3+CD8+ CLL BLD: 2.05 % (ref 1.4–2.6)
CD3+CD8+ CELLS # BLD: 811 CELLS/UL (ref 125–1312)
CD3+CD8+ CELLS NFR BLD: 29 % (ref 10–40)
CD3-CD16+CD56+ CELLS # BLD: 195 CELLS/UL (ref 95–640)
CD3-CD16+CD56+ CELLS NFR BLD: 7 % (ref 4–25)
IGG SERPL-MCNC: 1573 MG/DL (ref 610–1616)
T CELL EXTENDED COMMENT: NORMAL

## 2025-04-17 ENCOUNTER — TELEPHONE (OUTPATIENT)
Dept: GASTROENTEROLOGY | Facility: CLINIC | Age: 45
End: 2025-04-17

## 2025-04-17 NOTE — TELEPHONE ENCOUNTER
Sent Imagryt message also.    Joaquina Vega [facility]  [facility]   [address] [place] [address] [place] [address]  Ph: [phone] Fax: [phone]          I have attempted to contact this patient by phone with the following results: left message to return my call on answering machine.    Pt has an appt with Suma Rogers on 09/16. This needs to be changed to Dr. Ruffin. Augustin is available for video visits on 09/11 or 09/25. If pt wishes he can do an in person on 10/16, whichever he prefers. His lab appt will need to be changed also to coincide with new appt.    Joaquina Vega CMA  [facility]   [address] [place] [address] [place] [address]  Ph: [phone] Fax: [phone]

## 2025-04-17 NOTE — Clinical Note
May 28, 2025      Vinay Washington  [address]  [facility] [address]        {Comm Man dear:[id number]}          Sincerely,        Ashley Vega CMA    Electronically signed

## 2025-04-17 NOTE — LETTER
4/17/2025      Vinay Washington  [address] [address]        Hi Vinay,    You were originally scheduled with Suma Rogers for your follow up appointment on 09/16/25. This was scheduled in error and your appt needs to be with Dr. Ruffin. He will be here the following week on 09/23/25. I moved your appt to that day @ 3:30 with labs at 3:00. I hope this works for you. I have tried to reach out by phone and "[employer]hart, but was unable to reach you. If this doesn't work and needs to be rescheduled, please call us at [phone].    Thank you,    Joaquina Vega, [facility]  [facility]   [address] [place] [address] [place] [address]  Ph: [phone] Fax: [phone]

## 2025-04-22 ENCOUNTER — PATIENT OUTREACH (OUTPATIENT)
Dept: ONCOLOGY | Facility: CLINIC | Age: 45
End: 2025-04-22
Payer: COMMERCIAL

## 2025-04-22 NOTE — PROGRESS NOTES
[facility]: Cancer Care Follow-Up Note                                    Discussion with Patient:                                                      Patient has no questions or concerns and is aware of all future appts.               Dates of Treatment:                                                      Infusion given in last 28 days       None            Assessment:                                                          RNCC Short Symptom Review:     Assessment completed with:: Patient    General/Short Assessment  Does the patient have all their medications?: Yes  Is the patient experiencing any new or worsening symptoms?: No  Does the patient need any referrals to supportive care services?: No  Discussion with patient: Answered patient's questions and addressed concerns, Reviewed how and when to contact clinic, Reviewed patient's future appointments    Pain  Patient Reported Pain?: No    Patient Coping       Clinic Utilization  Patient Aware of Next Appointment?: Yes    Other Patient Concerns  Other Patient Reported Concerns: No    Intervention/Education provided during outreach:                                                           Patient to follow up as scheduled at next appt  Patient to call/Vostuhart message with updates  Confirmed patient has clinic and triage numbers    Signature:  Mylene Rader RN

## 2025-05-28 NOTE — TELEPHONE ENCOUNTER
Changed appt to Augustin and sent another mychart and mailed a letter.    Joaquina Vega, [facility]   [address] [facility] [place] [address]  Ph: [phone] Fax: [phone]

## 2025-08-31 ENCOUNTER — HEALTH MAINTENANCE LETTER (OUTPATIENT)
Age: 45
End: 2025-08-31